# Patient Record
Sex: MALE | Race: WHITE | NOT HISPANIC OR LATINO | Employment: OTHER | URBAN - NONMETROPOLITAN AREA
[De-identification: names, ages, dates, MRNs, and addresses within clinical notes are randomized per-mention and may not be internally consistent; named-entity substitution may affect disease eponyms.]

---

## 2017-05-30 ENCOUNTER — TELEPHONE (OUTPATIENT)
Dept: URGENT CARE | Facility: PHYSICIAN GROUP | Age: 67
End: 2017-05-30

## 2017-05-30 DIAGNOSIS — Z13.228 SCREENING FOR METABOLIC DISORDER: ICD-10-CM

## 2017-05-30 DIAGNOSIS — Z13.0 SCREENING FOR DEFICIENCY ANEMIA: ICD-10-CM

## 2017-05-30 NOTE — TELEPHONE ENCOUNTER
1. Caller Name: Cem                      Call Back Number: [phone] (home)       2. Message: Cem would like to get lab work done tomorrow so he can have it done before his appointment with you on June 12.    3. Patient approves office to leave a detailed voicemail/MyChart message: N\A

## 2017-05-31 ENCOUNTER — HOSPITAL ENCOUNTER (OUTPATIENT)
Dept: LAB | Facility: MEDICAL CENTER | Age: 67
End: 2017-05-31
Attending: FAMILY MEDICINE
Payer: MEDICARE

## 2017-05-31 DIAGNOSIS — Z13.0 SCREENING FOR DEFICIENCY ANEMIA: ICD-10-CM

## 2017-05-31 DIAGNOSIS — Z13.228 SCREENING FOR METABOLIC DISORDER: ICD-10-CM

## 2017-05-31 LAB
ALBUMIN SERPL BCP-MCNC: 4.2 G/DL (ref 3.2–4.9)
ALBUMIN/GLOB SERPL: 1.6 G/DL
ALP SERPL-CCNC: 53 U/L (ref 30–99)
ALT SERPL-CCNC: 13 U/L (ref 2–50)
ANION GAP SERPL CALC-SCNC: 6 MMOL/L (ref 0–11.9)
AST SERPL-CCNC: 17 U/L (ref 12–45)
BASOPHILS # BLD AUTO: 0.3 % (ref 0–1.8)
BASOPHILS # BLD: 0.02 K/UL (ref 0–0.12)
BILIRUB SERPL-MCNC: 0.8 MG/DL (ref 0.1–1.5)
BUN SERPL-MCNC: 21 MG/DL (ref 8–22)
CALCIUM SERPL-MCNC: 9.4 MG/DL (ref 8.5–10.5)
CHLORIDE SERPL-SCNC: 104 MMOL/L (ref 96–112)
CO2 SERPL-SCNC: 29 MMOL/L (ref 20–33)
CREAT SERPL-MCNC: 0.96 MG/DL (ref 0.5–1.4)
EOSINOPHIL # BLD AUTO: 0.12 K/UL (ref 0–0.51)
EOSINOPHIL NFR BLD: 2 % (ref 0–6.9)
ERYTHROCYTE [DISTWIDTH] IN BLOOD BY AUTOMATED COUNT: 44.5 FL (ref 35.9–50)
GFR SERPL CREATININE-BSD FRML MDRD: >60 ML/MIN/1.73 M 2
GLOBULIN SER CALC-MCNC: 2.7 G/DL (ref 1.9–3.5)
GLUCOSE SERPL-MCNC: 84 MG/DL (ref 65–99)
HCT VFR BLD AUTO: 49.2 % (ref 42–52)
HGB BLD-MCNC: 16.2 G/DL (ref 14–18)
IMM GRANULOCYTES # BLD AUTO: 0.01 K/UL (ref 0–0.11)
IMM GRANULOCYTES NFR BLD AUTO: 0.2 % (ref 0–0.9)
LYMPHOCYTES # BLD AUTO: 1.73 K/UL (ref 1–4.8)
LYMPHOCYTES NFR BLD: 29.3 % (ref 22–41)
MCH RBC QN AUTO: 31 PG (ref 27–33)
MCHC RBC AUTO-ENTMCNC: 32.9 G/DL (ref 33.7–35.3)
MCV RBC AUTO: 94.3 FL (ref 81.4–97.8)
MONOCYTES # BLD AUTO: 0.43 K/UL (ref 0–0.85)
MONOCYTES NFR BLD AUTO: 7.3 % (ref 0–13.4)
NEUTROPHILS # BLD AUTO: 3.6 K/UL (ref 1.82–7.42)
NEUTROPHILS NFR BLD: 60.9 % (ref 44–72)
NRBC # BLD AUTO: 0 K/UL
NRBC BLD AUTO-RTO: 0 /100 WBC
PLATELET # BLD AUTO: 204 K/UL (ref 164–446)
PMV BLD AUTO: 10.9 FL (ref 9–12.9)
POTASSIUM SERPL-SCNC: 4.1 MMOL/L (ref 3.6–5.5)
PROT SERPL-MCNC: 6.9 G/DL (ref 6–8.2)
RBC # BLD AUTO: 5.22 M/UL (ref 4.7–6.1)
SODIUM SERPL-SCNC: 139 MMOL/L (ref 135–145)
WBC # BLD AUTO: 5.9 K/UL (ref 4.8–10.8)

## 2017-05-31 PROCEDURE — 85025 COMPLETE CBC W/AUTO DIFF WBC: CPT | Mod: GY

## 2017-05-31 PROCEDURE — 36415 COLL VENOUS BLD VENIPUNCTURE: CPT | Mod: GY

## 2017-05-31 PROCEDURE — 80053 COMPREHEN METABOLIC PANEL: CPT | Mod: GY

## 2017-06-02 ENCOUNTER — TELEPHONE (OUTPATIENT)
Dept: MEDICAL GROUP | Facility: PHYSICIAN GROUP | Age: 67
End: 2017-06-02

## 2017-06-02 NOTE — TELEPHONE ENCOUNTER
----- Message from Mert Muniz M.D. sent at 6/1/2017  7:45 PM PDT -----  Your labs are all normal  Mert Muniz M.D.

## 2017-06-12 ENCOUNTER — OFFICE VISIT (OUTPATIENT)
Dept: MEDICAL GROUP | Facility: PHYSICIAN GROUP | Age: 67
End: 2017-06-12
Payer: MEDICARE

## 2017-06-12 VITALS
BODY MASS INDEX: 23.49 KG/M2 | OXYGEN SATURATION: 95 % | DIASTOLIC BLOOD PRESSURE: 74 MMHG | WEIGHT: 155 LBS | RESPIRATION RATE: 12 BRPM | TEMPERATURE: 97.5 F | HEIGHT: 68 IN | SYSTOLIC BLOOD PRESSURE: 136 MMHG | HEART RATE: 68 BPM

## 2017-06-12 DIAGNOSIS — Z00.00 ANNUAL PHYSICAL EXAM: ICD-10-CM

## 2017-06-12 DIAGNOSIS — R05.9 COUGH: ICD-10-CM

## 2017-06-12 PROCEDURE — 99214 OFFICE O/P EST MOD 30 MIN: CPT | Performed by: FAMILY MEDICINE

## 2017-06-12 ASSESSMENT — PATIENT HEALTH QUESTIONNAIRE - PHQ9: CLINICAL INTERPRETATION OF PHQ2 SCORE: 0

## 2017-06-12 NOTE — PROGRESS NOTES
"Subjective:   Cem Esquivel is a 67 y.o. male here today for his annual visit.     Cough  Feels taste buds are swollen  Wakes up at night with a choking sensation, gargling with salt and taking a menthol drop helps him go back to sleep  Dry nostrils. When he cleans the house and exposed to dust, starts sneezing and coughing.   Symptoms started after moving from [facility] to [place].   Advised to continue with salt water gargling, try some OTC claritin Flonase.   He had surgery in the past for removal of uvula which was too long. This was done by ENT.       Annual physical exam  Patient is here for annual exam. He was seen by ophthalmology for floaters in vision.They have lessened. He will follow up with ophthalmology as needed.   Up to date on colonoscopy, got tenanus immunization done in last 5 yrs.   3 mm pulm nodule stable on CT chest x 3 for 2 years. No further work up needed.  States he has Allergies: advised on OTC claritin and flonase. I offered him singulair and he will try OTC first.            Current medicines (including changes today)  No current outpatient prescriptions on file.     No current facility-administered medications for this visit.     He  has a past medical history of Diverticulitis and Pulmonary nodule.    ROS   No chest pain, no shortness of breath, no abdominal pain       Objective:     Blood pressure 136/74, pulse 68, temperature 36.4 °C (97.5 °F), resp. rate 12, height 1.727 m (5' 7.99\"), weight 70.308 kg (155 lb), SpO2 95 %. Body mass index is 23.57 kg/(m^2).  Physical Exam:  Constitutional: Alert, no distress.  Skin: Warm, dry, good turgor, no rashes in visible areas.  Eye: Equal, round and reactive, conjunctiva clear, lids normal.  ENMT: Lips without lesions, good dentition, oropharynx clear.  Neck: Trachea midline, no masses, no thyromegaly. No cervical or supraclavicular lymphadenopathy  Respiratory: Unlabored respiratory effort, lungs clear to auscultation, no wheezes, no " kishore.  Cardiovascular: Normal S1, S2, no murmur, no edema.  Abdomen: Soft, non-tender, no masses, no hepatosplenomegaly.  Psych: Alert and oriented x3, normal affect and mood.        Assessment and Plan:   The following treatment plan was discussed    1. Cough  OTC claritin and flonase recommended for allergies. Continue with salt water gargling as needed.   CT scans reviewed, stable 3mm lung nodule requiring no further work up. Will monitor for any worsening symptoms.     2. Annual physical exam  Labs and chest CT reviewed. All concerns addressed. He is up todate on colonoscopy and tetanus.   Patient will follow up in a year.         Followup: Return in about 1 year (around 6/12/2018), or if symptoms worsen or fail to improve, for annual.

## 2017-06-12 NOTE — MR AVS SNAPSHOT
"        Cem Neal Sierra   2017 8:00 AM   Office Visit   MRN: [id number]    Department:  [facility]   Dept Phone:  [phone]    Description:  Male : 1950   Provider:  Mert Muniz M.D.           Reason for Visit     Annual Exam           Allergies as of 2017     No Known Allergies      You were diagnosed with     Cough   [786.2.ICD-9-CM]       Annual physical exam   [749997]         Vital Signs     Blood Pressure Pulse Temperature Respirations Height Weight    136/74 mmHg 68 36.4 °C (97.5 °F) 12 1.727 m (5' 7.99\") 70.308 kg (155 lb)    Body Mass Index Oxygen Saturation Smoking Status             23.57 kg/m2 95% Former Smoker         Basic Information     Date Of Birth Sex Race Ethnicity Preferred Language    1950 Male White Non- English      Problem List              ICD-10-CM Priority Class Noted - Resolved    Smoking history Z87.891   2014 - Present    Family history of diabetes mellitus type II Z83.3   2014 - Present    Incidental pulmonary nodule, greater than or equal to 8mm R91.1   8/15/2014 - Present    Cough R05   2016 - Present    Annual physical exam Z00.00   2017 - Present      Health Maintenance        Date Due Completion Dates    IMM DTaP/Tdap/Td Vaccine (1 - Tdap) 1968 ---    IMM ZOSTER VACCINE 2009 ---    COLONOSCOPY 2026            Current Immunizations     13-VALENT PCV PREVNAR 2015 10:33 AM    Pneumococcal polysaccharide vaccine (PPSV-23) 2016      Below and/or attached are the medications your provider expects you to take. Review all of your home medications and newly ordered medications with your provider and/or pharmacist. Follow medication instructions as directed by your provider and/or pharmacist. Please keep your medication list with you and share with your provider. Update the information when medications are discontinued, doses are changed, or new medications (including over-the-counter " products) are added; and carry medication information at all times in the event of emergency situations     Allergies:  No Known Allergies          Medications  Valid as of: June 12, 2017 -  8:43 AM    Generic Name Brand Name Tablet Size Instructions for use    .                 Medicines prescribed today were sent to:     [facility] [address] [place] [address], [place] - [address] [facility]    [address] [facility] [address] [facility] [place] [address]    Phone: [phone] Fax: [phone]    Open 24 Hours?: No      Medication refill instructions:       If your prescription bottle indicates you have medication refills left, it is not necessary to call your provider’s office. Please contact your pharmacy and they will refill your medication.    If your prescription bottle indicates you do not have any refills left, you may request refills at any time through one of the following ways: The online [employer] system (except Urgent Care), by calling your provider’s office, or by asking your pharmacy to contact your provider’s office with a refill request. Medication refills are processed only during regular business hours and may not be available until the next business day. Your provider may request additional information or to have a follow-up visit with you prior to refilling your medication.   *Please Note: Medication refills are assigned a new Rx number when refilled electronically. Your pharmacy may indicate that no refills were authorized even though a new prescription for the same medication is available at the pharmacy. Please request the medicine by name with the pharmacy before contacting your provider for a refill.           [employer] Access Code: Activation code not generated  Current [employer] Status: Active

## 2017-06-12 NOTE — ASSESSMENT & PLAN NOTE
Patient is here for annual exam. He was seen by ophthalmology for floaters in vision.They have lessened. He will follow up with ophthalmology as needed.   Up to date on colonoscopy, got tenanus immunization done in last 5 yrs.   3 mm pulm nodule stable on CT chest x 3 for 2 years. No further work up needed.  States he has Allergies: advised on OTC claritin and flonase. I offered him singulair and he will try OTC first.

## 2018-03-30 ENCOUNTER — TELEPHONE (OUTPATIENT)
Dept: URGENT CARE | Facility: PHYSICIAN GROUP | Age: 68
End: 2018-03-30

## 2018-03-31 NOTE — TELEPHONE ENCOUNTER
1. Caller Name: Cem                       Call Back Number: [phone]    2. Message: Cem would like to know if he needs labs done prior to his next appointment.    3. Patient approves office to leave a detailed voicemail/MyChart message: no

## 2018-04-08 DIAGNOSIS — E78.5 DYSLIPIDEMIA: ICD-10-CM

## 2018-04-08 DIAGNOSIS — Z13.228 SCREENING FOR METABOLIC DISORDER: ICD-10-CM

## 2018-04-16 ENCOUNTER — HOSPITAL ENCOUNTER (OUTPATIENT)
Dept: LAB | Facility: MEDICAL CENTER | Age: 68
End: 2018-04-16
Attending: FAMILY MEDICINE
Payer: MEDICARE

## 2018-04-16 DIAGNOSIS — Z13.228 SCREENING FOR METABOLIC DISORDER: ICD-10-CM

## 2018-04-16 DIAGNOSIS — E78.5 DYSLIPIDEMIA: ICD-10-CM

## 2018-04-16 LAB
ALBUMIN SERPL BCP-MCNC: 4.1 G/DL (ref 3.2–4.9)
ALBUMIN/GLOB SERPL: 1.4 G/DL
ALP SERPL-CCNC: 56 U/L (ref 30–99)
ALT SERPL-CCNC: 17 U/L (ref 2–50)
ANION GAP SERPL CALC-SCNC: 7 MMOL/L (ref 0–11.9)
AST SERPL-CCNC: 24 U/L (ref 12–45)
BILIRUB SERPL-MCNC: 0.8 MG/DL (ref 0.1–1.5)
BUN SERPL-MCNC: 16 MG/DL (ref 8–22)
CALCIUM SERPL-MCNC: 9.4 MG/DL (ref 8.5–10.5)
CHLORIDE SERPL-SCNC: 99 MMOL/L (ref 96–112)
CHOLEST SERPL-MCNC: 205 MG/DL (ref 100–199)
CO2 SERPL-SCNC: 28 MMOL/L (ref 20–33)
CREAT SERPL-MCNC: 0.98 MG/DL (ref 0.5–1.4)
GLOBULIN SER CALC-MCNC: 3 G/DL (ref 1.9–3.5)
GLUCOSE SERPL-MCNC: 88 MG/DL (ref 65–99)
HDLC SERPL-MCNC: 55 MG/DL
LDLC SERPL CALC-MCNC: 134 MG/DL
POTASSIUM SERPL-SCNC: 4.4 MMOL/L (ref 3.6–5.5)
PROT SERPL-MCNC: 7.1 G/DL (ref 6–8.2)
SODIUM SERPL-SCNC: 134 MMOL/L (ref 135–145)
TRIGL SERPL-MCNC: 82 MG/DL (ref 0–149)

## 2018-04-16 PROCEDURE — 80053 COMPREHEN METABOLIC PANEL: CPT

## 2018-04-16 PROCEDURE — 36415 COLL VENOUS BLD VENIPUNCTURE: CPT

## 2018-04-16 PROCEDURE — 80061 LIPID PANEL: CPT

## 2018-06-14 ENCOUNTER — OFFICE VISIT (OUTPATIENT)
Dept: MEDICAL GROUP | Facility: PHYSICIAN GROUP | Age: 68
End: 2018-06-14
Payer: MEDICARE

## 2018-06-14 VITALS
OXYGEN SATURATION: 98 % | WEIGHT: 151.4 LBS | HEIGHT: 68 IN | TEMPERATURE: 98 F | SYSTOLIC BLOOD PRESSURE: 138 MMHG | DIASTOLIC BLOOD PRESSURE: 82 MMHG | RESPIRATION RATE: 16 BRPM | BODY MASS INDEX: 22.94 KG/M2 | HEART RATE: 69 BPM

## 2018-06-14 DIAGNOSIS — R39.9 LOWER URINARY TRACT SYMPTOMS (LUTS): ICD-10-CM

## 2018-06-14 DIAGNOSIS — Z12.5 SCREENING FOR MALIGNANT NEOPLASM OF PROSTATE: ICD-10-CM

## 2018-06-14 DIAGNOSIS — E78.5 DYSLIPIDEMIA: ICD-10-CM

## 2018-06-14 DIAGNOSIS — R05.9 COUGH: ICD-10-CM

## 2018-06-14 DIAGNOSIS — R39.11 URINARY HESITANCY: ICD-10-CM

## 2018-06-14 PROCEDURE — 99214 OFFICE O/P EST MOD 30 MIN: CPT | Performed by: FAMILY MEDICINE

## 2018-06-14 RX ORDER — LORATADINE 10 MG/1
10 TABLET ORAL DAILY
COMMUNITY
End: 2019-07-18

## 2018-06-14 ASSESSMENT — PATIENT HEALTH QUESTIONNAIRE - PHQ9: CLINICAL INTERPRETATION OF PHQ2 SCORE: 0

## 2018-06-14 NOTE — ASSESSMENT & PLAN NOTE
Has made big changes to his diet, cut out cookies, chips, milkshakes.   Results for NII JON (MRN [id number]) as of 6/14/2018 08:19   Ref. Range 4/16/2018 08:52   Cholesterol,Tot Latest Ref Range: 100 - 199 mg/dL 205 (H)   Triglycerides Latest Ref Range: 0 - 149 mg/dL 82   HDL Latest Ref Range: >=40 mg/dL 55   LDL Latest Ref Range: <100 mg/dL 134 (H)

## 2018-06-14 NOTE — ASSESSMENT & PLAN NOTE
Continues to have cough. He has been using loratidine which helps him 30% improvement in symptoms.

## 2018-06-14 NOTE — PROGRESS NOTES
"Subjective:   Cem Jon is a 68 y.o. male here today for evaluation and management of:     Cough  Continues to have cough. He has been using loratidine which helps him 30% improvement in symptoms.       Dyslipidemia  Has made big changes to his diet, cut out cookies, chips, milkshakes.   Results for CEM JON (MRN [id number]) as of 6/14/2018 08:19   Ref. Range 4/16/2018 08:52   Cholesterol,Tot Latest Ref Range: 100 - 199 mg/dL 205 (H)   Triglycerides Latest Ref Range: 0 - 149 mg/dL 82   HDL Latest Ref Range: >=40 mg/dL 55   LDL Latest Ref Range: <100 mg/dL 134 (H)          Current medicines (including changes today)  Current Outpatient Prescriptions   Medication Sig Dispense Refill   • loratadine (CLARITIN) 10 MG Tab Take 10 mg by mouth every day.       No current facility-administered medications for this visit.      He  has a past medical history of Diverticulitis and Pulmonary nodule.    ROS  No chest pain, no shortness of breath, no abdominal pain       Objective:     Blood pressure 138/82, pulse 69, temperature 36.7 °C (98 °F), resp. rate 16, height 1.715 m (5' 7.5\"), weight 68.7 kg (151 lb 6.4 oz), SpO2 98 %. Body mass index is 23.36 kg/m².   Physical Exam:  Constitutional: Alert, no distress.  Skin: Warm, dry, good turgor, no rashes in visible areas.  Eye: Equal, round and reactive, conjunctiva clear, lids normal.  ENMT: Lips without lesions, good dentition, oropharynx clear.  Neck: Trachea midline, no masses, no thyromegaly. No cervical or supraclavicular lymphadenopathy  Respiratory: Unlabored respiratory effort, lungs clear to auscultation, no wheezes, no ronchi.  Cardiovascular: Normal S1, S2, no murmur, no edema.  Abdomen: Soft, non-tender, no masses, no hepatosplenomegaly.  Psych: Alert and oriented x3, normal affect and mood.        Assessment and Plan:   The following treatment plan was discussed    1. Cough  Due to allergies improved 30% with the loratadine  - COMP METABOLIC PANEL; " Future    2. Dyslipidemia  Has made good weight changes  - LIPID PROFILE; Future      Followup: No Follow-up on file.

## 2019-02-12 ENCOUNTER — OFFICE VISIT (OUTPATIENT)
Dept: URGENT CARE | Facility: PHYSICIAN GROUP | Age: 69
End: 2019-02-12
Payer: MEDICARE

## 2019-02-12 VITALS
BODY MASS INDEX: 22.28 KG/M2 | RESPIRATION RATE: 16 BRPM | TEMPERATURE: 97.5 F | HEIGHT: 68 IN | WEIGHT: 147 LBS | OXYGEN SATURATION: 95 % | HEART RATE: 56 BPM | SYSTOLIC BLOOD PRESSURE: 124 MMHG | DIASTOLIC BLOOD PRESSURE: 84 MMHG

## 2019-02-12 DIAGNOSIS — J20.9 ACUTE BRONCHITIS, UNSPECIFIED ORGANISM: ICD-10-CM

## 2019-02-12 DIAGNOSIS — J22 ACUTE LOWER RESPIRATORY INFECTION: ICD-10-CM

## 2019-02-12 PROCEDURE — 99214 OFFICE O/P EST MOD 30 MIN: CPT | Performed by: FAMILY MEDICINE

## 2019-02-12 RX ORDER — AZITHROMYCIN 250 MG/1
TABLET, FILM COATED ORAL
Qty: 6 TAB | Refills: 0 | Status: SHIPPED | OUTPATIENT
Start: 2019-02-12 | End: 2019-07-10

## 2019-02-12 NOTE — PROGRESS NOTES
Chief Complaint:    Chief Complaint   Patient presents with   • URI     x 1 week, chest congestin       History of Present Illness:    This is a new problem. Symptoms x 7 days; has chest congestion and cough productive of purulent mucus. Overall at least moderate severity and not getting better, and possibly getting worse. Some clear rhinorrhea. No fever or sore throat. Z-vinod works/tolerates for similar previous symptoms.      Review of Systems:    Constitutional: Negative for fever, chills, and diaphoresis.   Eyes: Negative for change in vision, photophobia, pain, redness, and discharge.  ENT: See HPI.    Respiratory: See HPI.  Cardiovascular: Negative for chest pain, palpitations, orthopnea, claudication, leg swelling, and PND.   Gastrointestinal: Negative for abdominal pain, nausea, vomiting, diarrhea, constipation, blood in stool, and melena.   Genitourinary: Negative for dysuria, urinary urgency, urinary frequency, hematuria, and flank pain.   Musculoskeletal: Negative for myalgias, joint pain, neck pain, and back pain.   Skin: Negative for rash and itching.   Neurological: Negative for dizziness, tingling, tremors, sensory change, speech change, focal weakness, seizures, loss of consciousness, and headaches.   Endo: Negative for polydipsia.   Heme: Does not bruise/bleed easily.   Psychiatric/Behavioral: Negative for depression, suicidal ideas, hallucinations, memory loss and substance abuse. The patient is not nervous/anxious and does not have insomnia.        Past Medical History:    Past Medical History:   Diagnosis Date   • Diverticulitis    • Pulmonary nodule      Past Surgical History:    Past Surgical History:   Procedure Laterality Date   • COLON RESECTION      diverticulitis, 1980s     Social History:    Social History     Social History   • Marital status:      Spouse name: N/A   • Number of children: N/A   • Years of education: N/A     Occupational History   • Not on file.     Social History  "Main Topics   • Smoking status: Former Smoker     Packs/day: 1.00     Years: 35.00     Quit date: 8/7/2007   • Smokeless tobacco: Never Used   • Alcohol use No   • Drug use: No   • Sexual activity: Yes     Partners: Male     Other Topics Concern   • Not on file     Social History Narrative   • No narrative on file     Family History:    Family History   Problem Relation Age of Onset   • Heart Disease Mother    • Diabetes Mother    • Heart Disease Father      Medications:    Current Outpatient Prescriptions on File Prior to Visit   Medication Sig Dispense Refill   • loratadine (CLARITIN) 10 MG Tab Take 10 mg by mouth every day.       No current facility-administered medications on file prior to visit.      Allergies:    No Known Allergies      Vitals:    Vitals:    02/12/19 1455   BP: 124/84   BP Location: Left arm   Patient Position: Sitting   Pulse: (!) 56   Resp: 16   Temp: 36.4 °C (97.5 °F)   TempSrc: Temporal   SpO2: 95%   Weight: 66.7 kg (147 lb)   Height: 1.715 m (5' 7.5\")       Physical Exam:    Constitutional: Vital signs reviewed. Appears well-developed and well-nourished. Occl cough. No acute distress.   Eyes: Sclera white, conjunctivae clear.  ENT: External ears normal. Hearing grossly decreased. Nasal mucosa pink. Lips/teeth are normal. Oral mucosa pink and moist. Posterior pharynx: WNL.  Neck: Neck supple.   Cardiovascular: Regular rate and rhythm. No murmur.  Pulmonary/Chest: Respirations non-labored. Clear to auscultation bilaterally.  Lymph: Cervical nodes without tenderness or enlargement.  Musculoskeletal: Normal gait. Normal range of motion. No muscular atrophy or weakness.  Neurological: Alert and oriented to person, place, and time. Muscle tone normal. Coordination normal.   Skin: No rashes or lesions. Warm, dry, normal turgor.  Psychiatric: Normal mood and affect. Behavior is normal. Judgment and thought content normal.       Assessment / Plan:    1. Acute lower respiratory infection  - " azithromycin (ZITHROMAX) 250 MG Tab; 2 TABS ON DAY 1, 1 TAB ON DAYS 2-5.  Dispense: 6 Tab; Refill: 0    2. Acute bronchitis, unspecified organism      Discussed with him DDX, management options, and risks, benefits, and alternatives to treatment plan agreed upon.    Agreeable to medication prescribed.    Discussed expected course of duration, time for improvement, and to seek follow-up in Emergency Room, urgent care, or with PCP if getting worse at any time or not improving within expected time frame.

## 2019-04-15 ENCOUNTER — HOSPITAL ENCOUNTER (OUTPATIENT)
Dept: LAB | Facility: MEDICAL CENTER | Age: 69
End: 2019-04-15
Attending: FAMILY MEDICINE
Payer: MEDICARE

## 2019-04-15 DIAGNOSIS — Z12.5 SCREENING FOR MALIGNANT NEOPLASM OF PROSTATE: ICD-10-CM

## 2019-04-15 DIAGNOSIS — E78.5 DYSLIPIDEMIA: ICD-10-CM

## 2019-04-15 DIAGNOSIS — R05.9 COUGH: ICD-10-CM

## 2019-04-15 LAB
ALBUMIN SERPL BCP-MCNC: 4.5 G/DL (ref 3.2–4.9)
ALBUMIN/GLOB SERPL: 2 G/DL
ALP SERPL-CCNC: 49 U/L (ref 30–99)
ALT SERPL-CCNC: 20 U/L (ref 2–50)
ANION GAP SERPL CALC-SCNC: 7 MMOL/L (ref 0–11.9)
AST SERPL-CCNC: 25 U/L (ref 12–45)
BILIRUB SERPL-MCNC: 0.5 MG/DL (ref 0.1–1.5)
BUN SERPL-MCNC: 19 MG/DL (ref 8–22)
CALCIUM SERPL-MCNC: 9.6 MG/DL (ref 8.5–10.5)
CHLORIDE SERPL-SCNC: 104 MMOL/L (ref 96–112)
CHOLEST SERPL-MCNC: 196 MG/DL (ref 100–199)
CO2 SERPL-SCNC: 29 MMOL/L (ref 20–33)
CREAT SERPL-MCNC: 0.95 MG/DL (ref 0.5–1.4)
FASTING STATUS PATIENT QL REPORTED: NORMAL
GLOBULIN SER CALC-MCNC: 2.2 G/DL (ref 1.9–3.5)
GLUCOSE SERPL-MCNC: 97 MG/DL (ref 65–99)
HDLC SERPL-MCNC: 54 MG/DL
LDLC SERPL CALC-MCNC: 129 MG/DL
POTASSIUM SERPL-SCNC: 4.4 MMOL/L (ref 3.6–5.5)
PROT SERPL-MCNC: 6.7 G/DL (ref 6–8.2)
PSA SERPL-MCNC: 1.07 NG/ML (ref 0–4)
SODIUM SERPL-SCNC: 140 MMOL/L (ref 135–145)
TRIGL SERPL-MCNC: 67 MG/DL (ref 0–149)

## 2019-04-15 PROCEDURE — 84153 ASSAY OF PSA TOTAL: CPT | Mod: GA

## 2019-04-15 PROCEDURE — 80053 COMPREHEN METABOLIC PANEL: CPT

## 2019-04-15 PROCEDURE — 36415 COLL VENOUS BLD VENIPUNCTURE: CPT

## 2019-04-15 PROCEDURE — 80061 LIPID PANEL: CPT

## 2019-07-10 ENCOUNTER — OFFICE VISIT (OUTPATIENT)
Dept: MEDICAL GROUP | Facility: PHYSICIAN GROUP | Age: 69
End: 2019-07-10
Payer: MEDICARE

## 2019-07-10 VITALS
HEART RATE: 71 BPM | RESPIRATION RATE: 16 BRPM | BODY MASS INDEX: 22.13 KG/M2 | TEMPERATURE: 98.8 F | SYSTOLIC BLOOD PRESSURE: 130 MMHG | OXYGEN SATURATION: 95 % | HEIGHT: 68 IN | DIASTOLIC BLOOD PRESSURE: 80 MMHG | WEIGHT: 146 LBS

## 2019-07-10 DIAGNOSIS — Q38.5: ICD-10-CM

## 2019-07-10 DIAGNOSIS — Z00.00 ANNUAL PHYSICAL EXAM: ICD-10-CM

## 2019-07-10 DIAGNOSIS — K12.2: ICD-10-CM

## 2019-07-10 DIAGNOSIS — Z87.891 SMOKING HISTORY: ICD-10-CM

## 2019-07-10 DIAGNOSIS — R05.9 COUGH: ICD-10-CM

## 2019-07-10 DIAGNOSIS — R91.1 INCIDENTAL PULMONARY NODULE, GREATER THAN OR EQUAL TO 8MM: ICD-10-CM

## 2019-07-10 DIAGNOSIS — E78.5 DYSLIPIDEMIA: ICD-10-CM

## 2019-07-10 PROCEDURE — G0439 PPPS, SUBSEQ VISIT: HCPCS | Performed by: FAMILY MEDICINE

## 2019-07-10 ASSESSMENT — PATIENT HEALTH QUESTIONNAIRE - PHQ9: CLINICAL INTERPRETATION OF PHQ2 SCORE: 0

## 2019-07-10 ASSESSMENT — ACTIVITIES OF DAILY LIVING (ADL): BATHING_REQUIRES_ASSISTANCE: 0

## 2019-07-10 ASSESSMENT — ENCOUNTER SYMPTOMS: GENERAL WELL-BEING: EXCELLENT

## 2019-07-10 NOTE — ASSESSMENT & PLAN NOTE
Chronic condition improvingResults for NII JON (MRN [id number]) as of 7/10/2019 09:32   Ref. Range 4/15/2019 07:08   Cholesterol,Tot Latest Ref Range: 100 - 199 mg/dL 196   Triglycerides Latest Ref Range: 0 - 149 mg/dL 67   HDL Latest Ref Range: >=40 mg/dL 54   LDL Latest Ref Range: <100 mg/dL 129 (H)   Patient does not take a statin this is diet controlled.

## 2019-07-10 NOTE — ASSESSMENT & PLAN NOTE
Small stable pulmonary nodules on CT scan no further evaluation needed.  Patient is asymptomatic emphysema seen on CT scan patient has no complaints of shortness of breath.

## 2019-07-10 NOTE — ASSESSMENT & PLAN NOTE
Patient presents here for annual exam.  He notes he is feeling well with no shortness of breath.  Labs reviewed mild elevation in LDL but improved from last year CT scan last done reviewed shows stable pulmonary nodules.  Patient is up-to-date on his colonoscopy.  He is not interested in the shingles vaccine due to exorbitant cost

## 2019-07-10 NOTE — ASSESSMENT & PLAN NOTE
Patient continues to have a persistent cough that wakes him up at night.  No improvement with loratadine.  History of surgical removal of uvula in the past.  Also has numbness on the back of his tongue and in his throat.  Patient has seen Dr. Tang in the past was very pleased with this consult I will place a referral back to ENT.

## 2019-07-10 NOTE — PROGRESS NOTES
Chief Complaint   Patient presents with   • Annual Exam   • Weight Loss     x6 months   • Difficulty Swallowing     x 6mo-1year         HPI:  Cem Eqsuivel is a 69 y.o. here for Medicare Annual Wellness Visit     Patient Active Problem List    Diagnosis Date Noted   • Dyslipidemia 06/14/2018   • Annual physical exam 06/12/2017   • Cough 02/29/2016   • Incidental pulmonary nodule, greater than or equal to 8mm 08/15/2014   • Smoking history 08/07/2014   • Family history of diabetes mellitus type II 08/07/2014       Current Outpatient Prescriptions   Medication Sig Dispense Refill   • loratadine (CLARITIN) 10 MG Tab Take 10 mg by mouth every day.       No current facility-administered medications for this visit.             Current supplements as per medication list.       Allergies: Patient has no known allergies.    Current social contact/activities:      He  reports that he quit smoking about 11 years ago. He has a 35.00 pack-year smoking history. He has never used smokeless tobacco. He reports that he does not drink alcohol or use drugs.  Counseling given: Not Answered      DPA/Advanced Directive:  Patient does not have an Advanced Directive.  A packet and workshop information was given on Advanced Directives.    ROS:    Gait: Uses no assistive device  Ostomy: No  Other tubes: No  Amputations: No  Chronic oxygen use: No  Last eye exam: yesterday  Wears hearing aids: No   : Denies any urinary leakage during the last 6 months    Screening:    Depression Screening    Little interest or pleasure in doing things?  0 - not at all  Feeling down, depressed , or hopeless? 0 - not at all  Patient Health Questionnaire Score: 0     If depressive symptoms identified deferred to follow up visit unless specifically addressed in assessment and plan.    Interpretation of PHQ-9 Total Score   Score Severity   1-4 No Depression   5-9 Mild Depression   10-14 Moderate Depression   15-19 Moderately Severe Depression   20-27 Severe  Depression    Screening for Cognitive Impairment    Three Minute Recall (village, kitchen, baby) 2/3    Tonny clock face with all 12 numbers and set the hands to show 10 past 10.  Yes    Cognitive concerns identified deferred for follow up unless specifically addressed in assessment and plan.    Fall Risk Assessment    Has the patient had two or more falls in the last year or any fall with injury in the last year?  No    Safety Assessment    Throw rugs on floor.  No  Handrails on all stairs.  Yes  Good lighting in all hallways.  Yes  Difficulty hearing.  No  Patient counseled about all safety risks that were identified.    Functional Assessment ADLs    Are there any barriers preventing you from cooking for yourself or meeting nutritional needs?  No.    Are there any barriers preventing you from driving safely or obtaining transportation?  No.    Are there any barriers preventing you from using a telephone or calling for help?  No.    Are there any barriers preventing you from shopping?  No.    Are there any barriers preventing you from taking care of your own finances?  No.    Are there any barriers preventing you from managing your medications?  No.    Are there any barriers preventing you from showering, bathing or dressing yourself?  No.    Are you currently engaging in any exercise or physical activity?  Yes.     What is your perception of your health?  Excellent.      Health Maintenance Summary                HEPATITIS C SCREENING Overdue 1950     IMM ZOSTER VACCINES Overdue 5/22/1999     Annual Wellness Visit Overdue 6/10/2017      Done 6/9/2016 Visit Dx: Medicare annual wellness visit, subsequent    IMM INFLUENZA Next Due 9/1/2019     IMM DTaP/Tdap/Td Vaccine Next Due 5/18/2025      Done 5/18/2015 Imm Admin: Tdap Vaccine    COLONOSCOPY Next Due 7/11/2026      Done 7/11/2016 AMB REFERRAL TO GI FOR COLONOSCOPY          Patient Care Team:  Mert Muniz M.D. as PCP - General (Family Medicine)  Bruna SAMAYOA  "SYED Tang (Otolaryngology)        Social History   Substance Use Topics   • Smoking status: Former Smoker     Packs/day: 1.00     Years: 35.00     Quit date: 8/7/2007   • Smokeless tobacco: Never Used   • Alcohol use No     Family History   Problem Relation Age of Onset   • Heart Disease Mother    • Diabetes Mother    • Heart Disease Father      He  has a past medical history of Diverticulitis and Pulmonary nodule.   Past Surgical History:   Procedure Laterality Date   • COLON RESECTION      diverticulitis, 1980s       Exam:   /80 (BP Location: Left arm, Patient Position: Sitting, BP Cuff Size: Adult)   Pulse 71   Temp 37.1 °C (98.8 °F) (Temporal)   Resp 16   Ht 1.727 m (5' 8\")   Wt 66.2 kg (146 lb)   SpO2 95%  Body mass index is 22.2 kg/m².    Hearing excellent.    Dentition good  Alert, oriented in no acute distress.  Eye contact is good, speech goal directed, affect calm    Assessment and Plan. The following treatment and monitoring plan is recommended:    Incidental pulmonary nodule, greater than or equal to 8mm  Stable on repeat CT scan.     Annual physical exam  Patient presents here for annual exam.  He notes he is feeling well with no shortness of breath.  Labs reviewed mild elevation in LDL but improved from last year CT scan last done reviewed shows stable pulmonary nodules.  Patient is up-to-date on his colonoscopy.  He is not interested in the shingles vaccine due to exorbitant cost    Dyslipidemia  Chronic condition improvingResults for NII JON (MRN [id number]) as of 7/10/2019 09:32   Ref. Range 4/15/2019 07:08   Cholesterol,Tot Latest Ref Range: 100 - 199 mg/dL 196   Triglycerides Latest Ref Range: 0 - 149 mg/dL 67   HDL Latest Ref Range: >=40 mg/dL 54   LDL Latest Ref Range: <100 mg/dL 129 (H)   Patient does not take a statin this is diet controlled.    Smoking history  Small stable pulmonary nodules on CT scan no further evaluation needed.  Patient is asymptomatic emphysema " seen on CT scan patient has no complaints of shortness of breath.    Cough  Patient continues to have a persistent cough that wakes him up at night.  No improvement with loratadine.  History of surgical removal of uvula in the past.  Also has numbness on the back of his tongue and in his throat.  Patient has seen Dr. Tang in the past was very pleased with this consult I will place a referral back to ENT.      Services suggested: No services needed at this time  Health Care Screening: Age-appropriate preventive services recommended by USPTF and ACIP covered by Medicare were discussed today. Services ordered if indicated and agreed upon by the patient.  Referrals offered: Community-based lifestyle interventions to reduce health risks and promote self-management and wellness, fall prevention, nutrition, physical activity, tobacco-use cessation, weight loss, and mental health services as per orders if indicated.    Discussion today about general wellness and lifestyle habits:    · Prevent falls and reduce trip hazards; Cautioned about securing or removing rugs.  · Have a working fire alarm and carbon monoxide detector;   · Engage in regular physical activity and social activities     Follow-up: No Follow-up on file.

## 2019-07-17 RX ORDER — LEVOCETIRIZINE DIHYDROCHLORIDE 5 MG/1
1 TABLET, FILM COATED ORAL DAILY
Qty: 90 TAB | Refills: 3 | Status: SHIPPED | OUTPATIENT
Start: 2019-07-17 | End: 2019-07-18 | Stop reason: SDUPTHER

## 2019-07-18 RX ORDER — MONTELUKAST SODIUM 10 MG/1
10 TABLET ORAL DAILY
Qty: 90 TAB | Refills: 3 | Status: SHIPPED | OUTPATIENT
Start: 2019-07-18 | End: 2019-09-11

## 2019-07-18 RX ORDER — LEVOCETIRIZINE DIHYDROCHLORIDE 5 MG/1
1 TABLET, FILM COATED ORAL DAILY
Qty: 90 TAB | Refills: 3 | Status: SHIPPED | OUTPATIENT
Start: 2019-07-18 | End: 2020-01-01

## 2019-09-11 ENCOUNTER — OFFICE VISIT (OUTPATIENT)
Dept: MEDICAL GROUP | Facility: PHYSICIAN GROUP | Age: 69
End: 2019-09-11
Payer: MEDICARE

## 2019-09-11 VITALS
DIASTOLIC BLOOD PRESSURE: 98 MMHG | HEIGHT: 68 IN | TEMPERATURE: 98.9 F | BODY MASS INDEX: 22.28 KG/M2 | SYSTOLIC BLOOD PRESSURE: 166 MMHG | WEIGHT: 147 LBS | OXYGEN SATURATION: 97 % | HEART RATE: 68 BPM | RESPIRATION RATE: 14 BRPM

## 2019-09-11 DIAGNOSIS — N52.9 ERECTILE DYSFUNCTION, UNSPECIFIED ERECTILE DYSFUNCTION TYPE: ICD-10-CM

## 2019-09-11 DIAGNOSIS — Z11.59 NEED FOR HEPATITIS C SCREENING TEST: ICD-10-CM

## 2019-09-11 PROCEDURE — 99214 OFFICE O/P EST MOD 30 MIN: CPT | Performed by: FAMILY MEDICINE

## 2019-09-11 RX ORDER — SILDENAFIL 100 MG/1
100 TABLET, FILM COATED ORAL PRN
Qty: 10 TAB | Refills: 3 | Status: SHIPPED | OUTPATIENT
Start: 2019-09-11 | End: 2019-09-19 | Stop reason: SDUPTHER

## 2019-09-11 NOTE — PROGRESS NOTES
"Subjective:   Cem Esquivel is a 69 y.o. male here today for evaluation and management of:     ED (erectile dysfunction)  ED since last 2 weeks. Trouble maintaining an erection longer than a few minutes. Causing him anxiety and his bp is elevated at today's visit.   He has no known cardiac conditions. Advised on side effects.   Exam normal. Reassurance provided. Patient notes his anxiety went from 100 to 2!         Current medicines (including changes today)  Current Outpatient Medications   Medication Sig Dispense Refill   • sildenafil citrate (VIAGRA) 100 MG tablet Take 1 Tab by mouth as needed for Erectile Dysfunction. 10 Tab 3   • Levocetirizine Dihydrochloride 5 MG Tab Take 1 Tab by mouth every day. 90 Tab 3     No current facility-administered medications for this visit.      He  has a past medical history of Diverticulitis and Pulmonary nodule.    ROS  No chest pain, no shortness of breath, no abdominal pain       Objective:     BP (!) 166/98 (BP Location: Left arm, Patient Position: Sitting, BP Cuff Size: Adult)   Pulse 68   Temp 37.2 °C (98.9 °F) (Temporal)   Resp 14   Ht 1.727 m (5' 8\")   Wt 66.7 kg (147 lb)   SpO2 97%  Body mass index is 22.35 kg/m².   Physical Exam:  Constitutional: Alert, no distress.  Skin: Warm, dry, good turgor, no rashes in visible areas.  Eye: Equal, round and reactive, conjunctiva clear, lids normal.  ENMT: Lips without lesions, good dentition, oropharynx clear.  Neck: Trachea midline, no masses, no thyromegaly. No cervical or supraclavicular lymphadenopathy  Respiratory: Unlabored respiratory effort, lungs clear to auscultation, no wheezes, no ronchi.  Cardiovascular: Normal S1, S2, no murmur, no edema.  Abdomen: Soft, non-tender, no masses, no hepatosplenomegaly.  Psych: Alert and oriented x3, normal affect and mood.   exam: normal penis no lesions, normal testis b/l no masses   No inguinal hernia b/l      Assessment and Plan:   The following treatment plan was " discussed    1. Need for hepatitis C screening test  - Hep C Virus Antibody; Future    2. Erectile dysfunction, unspecified erectile dysfunction type  rx for sildenafil provided.       Followup: Return for april annual labs before visit in april/June.

## 2019-09-11 NOTE — ASSESSMENT & PLAN NOTE
ED since last 2 weeks. Trouble maintaining an erection longer than a few minutes. Causing him anxiety and his bp is elevated at today's visit.   He has no known cardiac conditions. Advised on side effects.   Exam normal. Reassurance provided. Patient notes his anxiety went from 100 to 2!

## 2019-09-17 ENCOUNTER — TELEPHONE (OUTPATIENT)
Dept: URGENT CARE | Facility: PHYSICIAN GROUP | Age: 69
End: 2019-09-17

## 2019-09-19 ENCOUNTER — TELEPHONE (OUTPATIENT)
Dept: URGENT CARE | Facility: PHYSICIAN GROUP | Age: 69
End: 2019-09-19

## 2019-09-19 RX ORDER — SILDENAFIL 100 MG/1
100 TABLET, FILM COATED ORAL PRN
Qty: 90 TAB | Refills: 0 | Status: SHIPPED | OUTPATIENT
Start: 2019-09-19 | End: 2020-01-01 | Stop reason: SDUPTHER

## 2019-09-19 NOTE — TELEPHONE ENCOUNTER
Patient called back regarding his viagra and would like 30 tablets or 90 tablets sent over to his pharmacy [facility].     [phone] (home)   Please call back thank you    See last TC

## 2019-12-19 ENCOUNTER — APPOINTMENT (RX ONLY)
Dept: URBAN - METROPOLITAN AREA CLINIC 4 | Facility: CLINIC | Age: 69
Setting detail: DERMATOLOGY
End: 2019-12-19

## 2019-12-19 DIAGNOSIS — L57.0 ACTINIC KERATOSIS: ICD-10-CM

## 2019-12-19 DIAGNOSIS — Z71.89 OTHER SPECIFIED COUNSELING: ICD-10-CM

## 2019-12-19 DIAGNOSIS — L82.1 OTHER SEBORRHEIC KERATOSIS: ICD-10-CM

## 2019-12-19 PROCEDURE — 99202 OFFICE O/P NEW SF 15 MIN: CPT | Mod: 25

## 2019-12-19 PROCEDURE — 17003 DESTRUCT PREMALG LES 2-14: CPT

## 2019-12-19 PROCEDURE — 17000 DESTRUCT PREMALG LESION: CPT

## 2019-12-19 PROCEDURE — ? LIQUID NITROGEN

## 2019-12-19 PROCEDURE — ? COUNSELING

## 2019-12-19 ASSESSMENT — LOCATION DETAILED DESCRIPTION DERM
LOCATION DETAILED: RIGHT DISTAL DORSAL FOREARM
LOCATION DETAILED: RIGHT MEDIAL ZYGOMA
LOCATION DETAILED: LEFT CENTRAL PARIETAL SCALP
LOCATION DETAILED: LEFT PROXIMAL DORSAL FOREARM
LOCATION DETAILED: RIGHT CENTRAL MALAR CHEEK
LOCATION DETAILED: RIGHT CENTRAL PARIETAL SCALP
LOCATION DETAILED: RIGHT SUPERIOR OCCIPITAL SCALP

## 2019-12-19 ASSESSMENT — LOCATION ZONE DERM
LOCATION ZONE: SCALP
LOCATION ZONE: FACE
LOCATION ZONE: ARM

## 2019-12-19 ASSESSMENT — LOCATION SIMPLE DESCRIPTION DERM
LOCATION SIMPLE: RIGHT ZYGOMA
LOCATION SIMPLE: RIGHT FOREARM
LOCATION SIMPLE: RIGHT OCCIPITAL SCALP
LOCATION SIMPLE: LEFT FOREARM
LOCATION SIMPLE: RIGHT CHEEK
LOCATION SIMPLE: SCALP

## 2020-01-01 ENCOUNTER — APPOINTMENT (OUTPATIENT)
Dept: CARDIOLOGY | Facility: MEDICAL CENTER | Age: 70
DRG: 215 | End: 2020-01-01
Attending: INTERNAL MEDICINE
Payer: MEDICARE

## 2020-01-01 ENCOUNTER — APPOINTMENT (OUTPATIENT)
Dept: RADIOLOGY | Facility: MEDICAL CENTER | Age: 70
DRG: 215 | End: 2020-01-01
Attending: INTERNAL MEDICINE
Payer: MEDICARE

## 2020-01-01 ENCOUNTER — APPOINTMENT (OUTPATIENT)
Dept: RADIOLOGY | Facility: MEDICAL CENTER | Age: 70
DRG: 215 | End: 2020-01-01
Attending: EMERGENCY MEDICINE
Payer: MEDICARE

## 2020-01-01 ENCOUNTER — PATIENT OUTREACH (OUTPATIENT)
Dept: SCHEDULING | Facility: IMAGING CENTER | Age: 70
End: 2020-01-01

## 2020-01-01 ENCOUNTER — HOSPITAL ENCOUNTER (INPATIENT)
Facility: MEDICAL CENTER | Age: 70
LOS: 1 days | DRG: 215 | End: 2020-09-24
Attending: EMERGENCY MEDICINE | Admitting: STUDENT IN AN ORGANIZED HEALTH CARE EDUCATION/TRAINING PROGRAM
Payer: MEDICARE

## 2020-01-01 VITALS
BODY MASS INDEX: 22.69 KG/M2 | TEMPERATURE: 95.8 F | SYSTOLIC BLOOD PRESSURE: 60 MMHG | DIASTOLIC BLOOD PRESSURE: 44 MMHG | HEIGHT: 68 IN | WEIGHT: 149.69 LBS

## 2020-01-01 DIAGNOSIS — R57.0 CARDIOGENIC SHOCK (HCC): ICD-10-CM

## 2020-01-01 DIAGNOSIS — I10 HYPERTENSION, UNSPECIFIED TYPE: ICD-10-CM

## 2020-01-01 DIAGNOSIS — I24.9 ACS (ACUTE CORONARY SYNDROME) (HCC): ICD-10-CM

## 2020-01-01 DIAGNOSIS — I46.9 CARDIAC ARREST (HCC): ICD-10-CM

## 2020-01-01 DIAGNOSIS — R79.89 ELEVATED TROPONIN: ICD-10-CM

## 2020-01-01 DIAGNOSIS — R94.31 T WAVE INVERSION IN EKG: Primary | ICD-10-CM

## 2020-01-01 LAB
ACT BLD: 224 SEC (ref 74–137)
ACT BLD: 258 SEC (ref 74–137)
ACTION RANGE TRIGGERED IACRT: YES
ALBUMIN SERPL BCP-MCNC: 4.4 G/DL (ref 3.2–4.9)
ALBUMIN SERPL BCP-MCNC: 4.4 G/DL (ref 3.2–4.9)
ALBUMIN/GLOB SERPL: 1.4 G/DL
ALBUMIN/GLOB SERPL: 1.5 G/DL
ALP SERPL-CCNC: 57 U/L (ref 30–99)
ALP SERPL-CCNC: 58 U/L (ref 30–99)
ALT SERPL-CCNC: 28 U/L (ref 2–50)
ALT SERPL-CCNC: 31 U/L (ref 2–50)
ANION GAP SERPL CALC-SCNC: 12 MMOL/L (ref 7–16)
ANION GAP SERPL CALC-SCNC: 16 MMOL/L (ref 7–16)
APTT PPP: 32.1 SEC (ref 24.7–36)
APTT PPP: 33.1 SEC (ref 24.7–36)
AST SERPL-CCNC: 67 U/L (ref 12–45)
AST SERPL-CCNC: 87 U/L (ref 12–45)
BASE EXCESS BLDA CALC-SCNC: -10 MMOL/L (ref -4–3)
BASE EXCESS BLDA CALC-SCNC: -12 MMOL/L (ref -4–3)
BASE EXCESS BLDA CALC-SCNC: 13 MMOL/L (ref -4–3)
BASOPHILS # BLD AUTO: 0.2 % (ref 0–1.8)
BASOPHILS # BLD AUTO: 0.2 % (ref 0–1.8)
BASOPHILS # BLD: 0.02 K/UL (ref 0–0.12)
BASOPHILS # BLD: 0.02 K/UL (ref 0–0.12)
BILIRUB SERPL-MCNC: 0.9 MG/DL (ref 0.1–1.5)
BILIRUB SERPL-MCNC: 1 MG/DL (ref 0.1–1.5)
BODY TEMPERATURE: ABNORMAL DEGREES
BUN SERPL-MCNC: 12 MG/DL (ref 8–22)
BUN SERPL-MCNC: 13 MG/DL (ref 8–22)
CA-I BLD ISE-SCNC: 0.86 MMOL/L (ref 1.1–1.3)
CA-I BLD ISE-SCNC: 0.92 MMOL/L (ref 1.1–1.3)
CALCIUM SERPL-MCNC: 10 MG/DL (ref 8.5–10.5)
CALCIUM SERPL-MCNC: 10.1 MG/DL (ref 8.5–10.5)
CHLORIDE SERPL-SCNC: 83 MMOL/L (ref 96–112)
CHLORIDE SERPL-SCNC: 86 MMOL/L (ref 96–112)
CHOLEST SERPL-MCNC: 168 MG/DL (ref 100–199)
CK MB SERPL-MCNC: 32.7 NG/ML (ref 0–5)
CO2 BLDA-SCNC: 18 MMOL/L (ref 20–33)
CO2 BLDA-SCNC: 18 MMOL/L (ref 20–33)
CO2 BLDA-SCNC: 42 MMOL/L (ref 20–33)
CO2 SERPL-SCNC: 25 MMOL/L (ref 20–33)
CO2 SERPL-SCNC: 31 MMOL/L (ref 20–33)
COVID ORDER STATUS COVID19: NORMAL
CREAT SERPL-MCNC: 0.62 MG/DL (ref 0.5–1.4)
CREAT SERPL-MCNC: 0.66 MG/DL (ref 0.5–1.4)
EKG IMPRESSION: NORMAL
EOSINOPHIL # BLD AUTO: 0.03 K/UL (ref 0–0.51)
EOSINOPHIL # BLD AUTO: 0.06 K/UL (ref 0–0.51)
EOSINOPHIL NFR BLD: 0.3 % (ref 0–6.9)
EOSINOPHIL NFR BLD: 0.5 % (ref 0–6.9)
ERYTHROCYTE [DISTWIDTH] IN BLOOD BY AUTOMATED COUNT: 43.6 FL (ref 35.9–50)
ERYTHROCYTE [DISTWIDTH] IN BLOOD BY AUTOMATED COUNT: 44.3 FL (ref 35.9–50)
GLOBULIN SER CALC-MCNC: 2.9 G/DL (ref 1.9–3.5)
GLOBULIN SER CALC-MCNC: 3.1 G/DL (ref 1.9–3.5)
GLUCOSE SERPL-MCNC: 120 MG/DL (ref 65–99)
GLUCOSE SERPL-MCNC: 133 MG/DL (ref 65–99)
HCO3 BLDA-SCNC: 16.3 MMOL/L (ref 17–25)
HCO3 BLDA-SCNC: 16.9 MMOL/L (ref 17–25)
HCO3 BLDA-SCNC: 39.8 MMOL/L (ref 17–25)
HCT VFR BLD AUTO: 47.8 % (ref 42–52)
HCT VFR BLD AUTO: 50.8 % (ref 42–52)
HCT VFR BLD CALC: 30 % (ref 42–52)
HCT VFR BLD CALC: 31 % (ref 42–52)
HCT VFR BLD CALC: 36 % (ref 42–52)
HDLC SERPL-MCNC: 72 MG/DL
HGB BLD-MCNC: 10.2 G/DL (ref 14–18)
HGB BLD-MCNC: 10.5 G/DL (ref 14–18)
HGB BLD-MCNC: 12.2 G/DL (ref 14–18)
HGB BLD-MCNC: 16.7 G/DL (ref 14–18)
HGB BLD-MCNC: 17.3 G/DL (ref 14–18)
HOROWITZ INDEX BLDA+IHG-RTO: 53 MM[HG]
HOROWITZ INDEX BLDA+IHG-RTO: 77 MM[HG]
IMM GRANULOCYTES # BLD AUTO: 0.04 K/UL (ref 0–0.11)
IMM GRANULOCYTES # BLD AUTO: 0.05 K/UL (ref 0–0.11)
IMM GRANULOCYTES NFR BLD AUTO: 0.4 % (ref 0–0.9)
IMM GRANULOCYTES NFR BLD AUTO: 0.4 % (ref 0–0.9)
INR PPP: 0.96 (ref 0.87–1.13)
INR PPP: 1.06 (ref 0.87–1.13)
INST. QUALIFIED PATIENT IIQPT: YES
LDLC SERPL CALC-MCNC: 78 MG/DL
LIPASE SERPL-CCNC: 66 U/L (ref 11–82)
LYMPHOCYTES # BLD AUTO: 1.56 K/UL (ref 1–4.8)
LYMPHOCYTES # BLD AUTO: 1.72 K/UL (ref 1–4.8)
LYMPHOCYTES NFR BLD: 14.7 % (ref 22–41)
LYMPHOCYTES NFR BLD: 16.5 % (ref 22–41)
MAGNESIUM SERPL-MCNC: 1.7 MG/DL (ref 1.5–2.5)
MCH RBC QN AUTO: 31.7 PG (ref 27–33)
MCH RBC QN AUTO: 31.9 PG (ref 27–33)
MCHC RBC AUTO-ENTMCNC: 34.1 G/DL (ref 33.7–35.3)
MCHC RBC AUTO-ENTMCNC: 34.9 G/DL (ref 33.7–35.3)
MCV RBC AUTO: 91.2 FL (ref 81.4–97.8)
MCV RBC AUTO: 93 FL (ref 81.4–97.8)
MONOCYTES # BLD AUTO: 0.72 K/UL (ref 0–0.85)
MONOCYTES # BLD AUTO: 0.95 K/UL (ref 0–0.85)
MONOCYTES NFR BLD AUTO: 7.6 % (ref 0–13.4)
MONOCYTES NFR BLD AUTO: 8.1 % (ref 0–13.4)
NEUTROPHILS # BLD AUTO: 7.1 K/UL (ref 1.82–7.42)
NEUTROPHILS # BLD AUTO: 8.94 K/UL (ref 1.82–7.42)
NEUTROPHILS NFR BLD: 75 % (ref 44–72)
NEUTROPHILS NFR BLD: 76.1 % (ref 44–72)
NRBC # BLD AUTO: 0 K/UL
NRBC # BLD AUTO: 0 K/UL
NRBC BLD-RTO: 0 /100 WBC
NRBC BLD-RTO: 0 /100 WBC
NT-PROBNP SERPL IA-MCNC: 1937 PG/ML (ref 0–125)
O2/TOTAL GAS SETTING VFR VENT: 100 %
O2/TOTAL GAS SETTING VFR VENT: 100 %
PCO2 BLDA: 41.9 MMHG (ref 26–37)
PCO2 BLDA: 45.9 MMHG (ref 26–37)
PCO2 BLDA: 63 MMHG (ref 26–37)
PCO2 TEMP ADJ BLDA: 34.3 MMHG (ref 26–37)
PCO2 TEMP ADJ BLDA: 41.3 MMHG (ref 26–37)
PH BLDA: 7.16 [PH] (ref 7.4–7.5)
PH BLDA: 7.21 [PH] (ref 7.4–7.5)
PH BLDA: 7.41 [PH] (ref 7.4–7.5)
PH TEMP ADJ BLDA: 7.22 [PH] (ref 7.4–7.5)
PH TEMP ADJ BLDA: 7.25 [PH] (ref 7.4–7.5)
PLATELET # BLD AUTO: 220 K/UL (ref 164–446)
PLATELET # BLD AUTO: 232 K/UL (ref 164–446)
PMV BLD AUTO: 10.1 FL (ref 9–12.9)
PMV BLD AUTO: 10.4 FL (ref 9–12.9)
PO2 BLDA: 53 MMHG (ref 64–87)
PO2 BLDA: 77 MMHG (ref 64–87)
PO2 BLDA: 92 MMHG (ref 64–87)
PO2 TEMP ADJ BLDA: 33 MMHG (ref 64–87)
PO2 TEMP ADJ BLDA: 75 MMHG (ref 64–87)
POTASSIUM BLD-SCNC: 2.8 MMOL/L (ref 3.6–5.5)
POTASSIUM BLD-SCNC: 3.3 MMOL/L (ref 3.6–5.5)
POTASSIUM SERPL-SCNC: 3.9 MMOL/L (ref 3.6–5.5)
POTASSIUM SERPL-SCNC: 3.9 MMOL/L (ref 3.6–5.5)
PROT SERPL-MCNC: 7.3 G/DL (ref 6–8.2)
PROT SERPL-MCNC: 7.5 G/DL (ref 6–8.2)
PROTHROMBIN TIME: 13.1 SEC (ref 12–14.6)
PROTHROMBIN TIME: 14.1 SEC (ref 12–14.6)
RBC # BLD AUTO: 5.24 M/UL (ref 4.7–6.1)
RBC # BLD AUTO: 5.46 M/UL (ref 4.7–6.1)
SAO2 % BLDA: 77 % (ref 93–99)
SAO2 % BLDA: 92 % (ref 93–99)
SAO2 % BLDA: 97 % (ref 93–99)
SARS-COV-2 RNA RESP QL NAA+PROBE: NOTDETECTED
SODIUM BLD-SCNC: 135 MMOL/L (ref 135–145)
SODIUM BLD-SCNC: 142 MMOL/L (ref 135–145)
SODIUM SERPL-SCNC: 126 MMOL/L (ref 135–145)
SODIUM SERPL-SCNC: 127 MMOL/L (ref 135–145)
SPECIMEN DRAWN FROM PATIENT: ABNORMAL
SPECIMEN SOURCE: NORMAL
TRIGL SERPL-MCNC: 90 MG/DL (ref 0–149)
TROPONIN T SERPL-MCNC: 723 NG/L (ref 6–19)
TROPONIN T SERPL-MCNC: 728 NG/L (ref 6–19)
TROPONIN T SERPL-MCNC: 854 NG/L (ref 6–19)
UFH PPP CHRO-ACNC: <0.1 IU/ML
UFH PPP CHRO-ACNC: <0.1 IU/ML
WBC # BLD AUTO: 11.7 K/UL (ref 4.8–10.8)
WBC # BLD AUTO: 9.5 K/UL (ref 4.8–10.8)

## 2020-01-01 PROCEDURE — 84132 ASSAY OF SERUM POTASSIUM: CPT

## 2020-01-01 PROCEDURE — 700117 HCHG RX CONTRAST REV CODE 255: Performed by: INTERNAL MEDICINE

## 2020-01-01 PROCEDURE — 83690 ASSAY OF LIPASE: CPT

## 2020-01-01 PROCEDURE — 700111 HCHG RX REV CODE 636 W/ 250 OVERRIDE (IP): Performed by: STUDENT IN AN ORGANIZED HEALTH CARE EDUCATION/TRAINING PROGRAM

## 2020-01-01 PROCEDURE — 83735 ASSAY OF MAGNESIUM: CPT

## 2020-01-01 PROCEDURE — 99291 CRITICAL CARE FIRST HOUR: CPT | Mod: 25 | Performed by: INTERNAL MEDICINE

## 2020-01-01 PROCEDURE — 02HV33Z INSERTION OF INFUSION DEVICE INTO SUPERIOR VENA CAVA, PERCUTANEOUS APPROACH: ICD-10-PCS | Performed by: INTERNAL MEDICINE

## 2020-01-01 PROCEDURE — 02HA3RZ INSERTION OF SHORT-TERM EXTERNAL HEART ASSIST SYSTEM INTO HEART, PERCUTANEOUS APPROACH: ICD-10-PCS | Performed by: INTERNAL MEDICINE

## 2020-01-01 PROCEDURE — 99152 MOD SED SAME PHYS/QHP 5/>YRS: CPT | Performed by: INTERNAL MEDICINE

## 2020-01-01 PROCEDURE — 36556 INSERT NON-TUNNEL CV CATH: CPT | Mod: RT | Performed by: INTERNAL MEDICINE

## 2020-01-01 PROCEDURE — 36556 INSERT NON-TUNNEL CV CATH: CPT | Performed by: INTERNAL MEDICINE

## 2020-01-01 PROCEDURE — 71045 X-RAY EXAM CHEST 1 VIEW: CPT

## 2020-01-01 PROCEDURE — 94770 HCHG CO2 EXPIRED GAS DETERMINATION: CPT

## 2020-01-01 PROCEDURE — 83880 ASSAY OF NATRIURETIC PEPTIDE: CPT

## 2020-01-01 PROCEDURE — 85520 HEPARIN ASSAY: CPT

## 2020-01-01 PROCEDURE — 5A0221D ASSISTANCE WITH CARDIAC OUTPUT USING IMPELLER PUMP, CONTINUOUS: ICD-10-PCS | Performed by: INTERNAL MEDICINE

## 2020-01-01 PROCEDURE — 85347 COAGULATION TIME ACTIVATED: CPT

## 2020-01-01 PROCEDURE — 99222 1ST HOSP IP/OBS MODERATE 55: CPT | Mod: AI | Performed by: STUDENT IN AN ORGANIZED HEALTH CARE EDUCATION/TRAINING PROGRAM

## 2020-01-01 PROCEDURE — 5A1935Z RESPIRATORY VENTILATION, LESS THAN 24 CONSECUTIVE HOURS: ICD-10-PCS | Performed by: INTERNAL MEDICINE

## 2020-01-01 PROCEDURE — 700102 HCHG RX REV CODE 250 W/ 637 OVERRIDE(OP): Performed by: NURSE PRACTITIONER

## 2020-01-01 PROCEDURE — 5A1213Z PERFORMANCE OF CARDIAC PACING, INTERMITTENT: ICD-10-PCS | Performed by: INTERNAL MEDICINE

## 2020-01-01 PROCEDURE — 99233 SBSQ HOSP IP/OBS HIGH 50: CPT | Performed by: STUDENT IN AN ORGANIZED HEALTH CARE EDUCATION/TRAINING PROGRAM

## 2020-01-01 PROCEDURE — 99223 1ST HOSP IP/OBS HIGH 75: CPT | Mod: 25 | Performed by: INTERNAL MEDICINE

## 2020-01-01 PROCEDURE — 99292 CRITICAL CARE ADDL 30 MIN: CPT | Mod: 25 | Performed by: INTERNAL MEDICINE

## 2020-01-01 PROCEDURE — 99285 EMERGENCY DEPT VISIT HI MDM: CPT

## 2020-01-01 PROCEDURE — B2151ZZ FLUOROSCOPY OF LEFT HEART USING LOW OSMOLAR CONTRAST: ICD-10-PCS | Performed by: INTERNAL MEDICINE

## 2020-01-01 PROCEDURE — 700102 HCHG RX REV CODE 250 W/ 637 OVERRIDE(OP): Performed by: EMERGENCY MEDICINE

## 2020-01-01 PROCEDURE — 92928 PRQ TCAT PLMT NTRAC ST 1 LES: CPT | Mod: 78,LM | Performed by: INTERNAL MEDICINE

## 2020-01-01 PROCEDURE — 85014 HEMATOCRIT: CPT | Mod: 91

## 2020-01-01 PROCEDURE — 700101 HCHG RX REV CODE 250

## 2020-01-01 PROCEDURE — 82803 BLOOD GASES ANY COMBINATION: CPT

## 2020-01-01 PROCEDURE — 80053 COMPREHEN METABOLIC PANEL: CPT

## 2020-01-01 PROCEDURE — 700111 HCHG RX REV CODE 636 W/ 250 OVERRIDE (IP)

## 2020-01-01 PROCEDURE — 93005 ELECTROCARDIOGRAM TRACING: CPT | Performed by: EMERGENCY MEDICINE

## 2020-01-01 PROCEDURE — 5A2204Z RESTORATION OF CARDIAC RHYTHM, SINGLE: ICD-10-PCS | Performed by: INTERNAL MEDICINE

## 2020-01-01 PROCEDURE — 99153 MOD SED SAME PHYS/QHP EA: CPT

## 2020-01-01 PROCEDURE — 700101 HCHG RX REV CODE 250: Performed by: INTERNAL MEDICINE

## 2020-01-01 PROCEDURE — 80061 LIPID PANEL: CPT

## 2020-01-01 PROCEDURE — A9270 NON-COVERED ITEM OR SERVICE: HCPCS | Performed by: EMERGENCY MEDICINE

## 2020-01-01 PROCEDURE — B2111ZZ FLUOROSCOPY OF MULTIPLE CORONARY ARTERIES USING LOW OSMOLAR CONTRAST: ICD-10-PCS | Performed by: INTERNAL MEDICINE

## 2020-01-01 PROCEDURE — 99233 SBSQ HOSP IP/OBS HIGH 50: CPT | Performed by: THORACIC SURGERY (CARDIOTHORACIC VASCULAR SURGERY)

## 2020-01-01 PROCEDURE — 93308 TTE F-UP OR LMTD: CPT | Mod: 26 | Performed by: INTERNAL MEDICINE

## 2020-01-01 PROCEDURE — 93005 ELECTROCARDIOGRAM TRACING: CPT | Performed by: STUDENT IN AN ORGANIZED HEALTH CARE EDUCATION/TRAINING PROGRAM

## 2020-01-01 PROCEDURE — 93308 TTE F-UP OR LMTD: CPT

## 2020-01-01 PROCEDURE — 770020 HCHG ROOM/CARE - TELE (206)

## 2020-01-01 PROCEDURE — 5A12012 PERFORMANCE OF CARDIAC OUTPUT, SINGLE, MANUAL: ICD-10-PCS | Performed by: INTERNAL MEDICINE

## 2020-01-01 PROCEDURE — 700102 HCHG RX REV CODE 250 W/ 637 OVERRIDE(OP)

## 2020-01-01 PROCEDURE — 4A023N7 MEASUREMENT OF CARDIAC SAMPLING AND PRESSURE, LEFT HEART, PERCUTANEOUS APPROACH: ICD-10-PCS | Performed by: INTERNAL MEDICINE

## 2020-01-01 PROCEDURE — 700111 HCHG RX REV CODE 636 W/ 250 OVERRIDE (IP): Performed by: INTERNAL MEDICINE

## 2020-01-01 PROCEDURE — 85025 COMPLETE CBC W/AUTO DIFF WBC: CPT

## 2020-01-01 PROCEDURE — 33210 INSERT ELECTRD/PM CATH SNGL: CPT | Performed by: INTERNAL MEDICINE

## 2020-01-01 PROCEDURE — 94002 VENT MGMT INPAT INIT DAY: CPT

## 2020-01-01 PROCEDURE — 96374 THER/PROPH/DIAG INJ IV PUSH: CPT

## 2020-01-01 PROCEDURE — 84484 ASSAY OF TROPONIN QUANT: CPT | Mod: 91

## 2020-01-01 PROCEDURE — 33990 INSJ PERQ VAD L HRT ARTERIAL: CPT | Mod: 78 | Performed by: INTERNAL MEDICINE

## 2020-01-01 PROCEDURE — 0BH17EZ INSERTION OF ENDOTRACHEAL AIRWAY INTO TRACHEA, VIA NATURAL OR ARTIFICIAL OPENING: ICD-10-PCS | Performed by: INTERNAL MEDICINE

## 2020-01-01 PROCEDURE — 700102 HCHG RX REV CODE 250 W/ 637 OVERRIDE(OP): Performed by: STUDENT IN AN ORGANIZED HEALTH CARE EDUCATION/TRAINING PROGRAM

## 2020-01-01 PROCEDURE — C9803 HOPD COVID-19 SPEC COLLECT: HCPCS | Performed by: EMERGENCY MEDICINE

## 2020-01-01 PROCEDURE — 85730 THROMBOPLASTIN TIME PARTIAL: CPT

## 2020-01-01 PROCEDURE — U0003 INFECTIOUS AGENT DETECTION BY NUCLEIC ACID (DNA OR RNA); SEVERE ACUTE RESPIRATORY SYNDROME CORONAVIRUS 2 (SARS-COV-2) (CORONAVIRUS DISEASE [COVID-19]), AMPLIFIED PROBE TECHNIQUE, MAKING USE OF HIGH THROUGHPUT TECHNOLOGIES AS DESCRIBED BY CMS-2020-01-R: HCPCS

## 2020-01-01 PROCEDURE — 93010 ELECTROCARDIOGRAM REPORT: CPT | Performed by: INTERNAL MEDICINE

## 2020-01-01 PROCEDURE — 82553 CREATINE MB FRACTION: CPT

## 2020-01-01 PROCEDURE — A9270 NON-COVERED ITEM OR SERVICE: HCPCS | Performed by: NURSE PRACTITIONER

## 2020-01-01 PROCEDURE — 82330 ASSAY OF CALCIUM: CPT | Mod: 91

## 2020-01-01 PROCEDURE — 027035Z DILATION OF CORONARY ARTERY, ONE ARTERY WITH TWO DRUG-ELUTING INTRALUMINAL DEVICES, PERCUTANEOUS APPROACH: ICD-10-PCS | Performed by: INTERNAL MEDICINE

## 2020-01-01 PROCEDURE — 31500 INSERT EMERGENCY AIRWAY: CPT | Performed by: INTERNAL MEDICINE

## 2020-01-01 PROCEDURE — 700105 HCHG RX REV CODE 258: Performed by: INTERNAL MEDICINE

## 2020-01-01 PROCEDURE — A9270 NON-COVERED ITEM OR SERVICE: HCPCS

## 2020-01-01 PROCEDURE — 92950 HEART/LUNG RESUSCITATION CPR: CPT | Performed by: INTERNAL MEDICINE

## 2020-01-01 PROCEDURE — 84484 ASSAY OF TROPONIN QUANT: CPT

## 2020-01-01 PROCEDURE — 85610 PROTHROMBIN TIME: CPT

## 2020-01-01 PROCEDURE — 92950 HEART/LUNG RESUSCITATION CPR: CPT

## 2020-01-01 PROCEDURE — 93458 L HRT ARTERY/VENTRICLE ANGIO: CPT | Mod: 26,59 | Performed by: INTERNAL MEDICINE

## 2020-01-01 PROCEDURE — A9270 NON-COVERED ITEM OR SERVICE: HCPCS | Performed by: STUDENT IN AN ORGANIZED HEALTH CARE EDUCATION/TRAINING PROGRAM

## 2020-01-01 PROCEDURE — 84295 ASSAY OF SERUM SODIUM: CPT

## 2020-01-01 RX ORDER — DOPAMINE HYDROCHLORIDE 160 MG/100ML
0-20 INJECTION, SOLUTION INTRAVENOUS CONTINUOUS
Status: DISCONTINUED | OUTPATIENT
Start: 2020-01-01 | End: 2020-09-25 | Stop reason: HOSPADM

## 2020-01-01 RX ORDER — ACETAMINOPHEN 325 MG/1
650 TABLET ORAL EVERY 6 HOURS PRN
Status: DISCONTINUED | OUTPATIENT
Start: 2020-01-01 | End: 2020-01-01

## 2020-01-01 RX ORDER — HEPARIN SODIUM,PORCINE 1000/ML
VIAL (ML) INJECTION
Status: COMPLETED
Start: 2020-01-01 | End: 2020-01-01

## 2020-01-01 RX ORDER — MIDAZOLAM HYDROCHLORIDE 1 MG/ML
5 INJECTION INTRAMUSCULAR; INTRAVENOUS
Status: DISCONTINUED | OUTPATIENT
Start: 2020-01-01 | End: 2020-01-01

## 2020-01-01 RX ORDER — POTASSIUM CHLORIDE 7.45 MG/ML
INJECTION INTRAVENOUS
Status: DISCONTINUED
Start: 2020-01-01 | End: 2020-09-25 | Stop reason: HOSPADM

## 2020-01-01 RX ORDER — MORPHINE SULFATE 10 MG/ML
10 INJECTION, SOLUTION INTRAMUSCULAR; INTRAVENOUS
Status: DISCONTINUED | OUTPATIENT
Start: 2020-01-01 | End: 2020-09-25 | Stop reason: HOSPADM

## 2020-01-01 RX ORDER — HYDROCHLOROTHIAZIDE 25 MG/1
25 TABLET ORAL DAILY
COMMUNITY

## 2020-01-01 RX ORDER — ONDANSETRON 2 MG/ML
4 INJECTION INTRAMUSCULAR; INTRAVENOUS EVERY 4 HOURS PRN
Status: DISCONTINUED | OUTPATIENT
Start: 2020-01-01 | End: 2020-01-01

## 2020-01-01 RX ORDER — EPINEPHRINE HCL IN 0.9 % NACL 4MG/250ML
0-20 PLASTIC BAG, INJECTION (ML) INTRAVENOUS CONTINUOUS
Status: DISCONTINUED | OUTPATIENT
Start: 2020-01-01 | End: 2020-01-01

## 2020-01-01 RX ORDER — MIDAZOLAM HYDROCHLORIDE 1 MG/ML
INJECTION INTRAMUSCULAR; INTRAVENOUS
Status: COMPLETED
Start: 2020-01-01 | End: 2020-01-01

## 2020-01-01 RX ORDER — ONDANSETRON 4 MG/1
4 TABLET, ORALLY DISINTEGRATING ORAL EVERY 4 HOURS PRN
Status: DISCONTINUED | OUTPATIENT
Start: 2020-01-01 | End: 2020-01-01

## 2020-01-01 RX ORDER — SODIUM CHLORIDE 9 MG/ML
INJECTION, SOLUTION INTRAVENOUS
Status: DISCONTINUED
Start: 2020-01-01 | End: 2020-09-25 | Stop reason: HOSPADM

## 2020-01-01 RX ORDER — ONDANSETRON 2 MG/ML
4 INJECTION INTRAMUSCULAR; INTRAVENOUS ONCE
Status: COMPLETED | OUTPATIENT
Start: 2020-01-01 | End: 2020-01-01

## 2020-01-01 RX ORDER — VERAPAMIL HYDROCHLORIDE 2.5 MG/ML
INJECTION, SOLUTION INTRAVENOUS
Status: COMPLETED
Start: 2020-01-01 | End: 2020-01-01

## 2020-01-01 RX ORDER — HYDROCHLOROTHIAZIDE 25 MG/1
25 TABLET ORAL DAILY
Status: DISCONTINUED | OUTPATIENT
Start: 2020-01-01 | End: 2020-01-01

## 2020-01-01 RX ORDER — LORAZEPAM 2 MG/ML
1 CONCENTRATE ORAL
Status: DISCONTINUED | OUTPATIENT
Start: 2020-01-01 | End: 2020-09-25 | Stop reason: HOSPADM

## 2020-01-01 RX ORDER — ONDANSETRON 2 MG/ML
INJECTION INTRAMUSCULAR; INTRAVENOUS
Status: COMPLETED
Start: 2020-01-01 | End: 2020-01-01

## 2020-01-01 RX ORDER — AMOXICILLIN 250 MG
2 CAPSULE ORAL 2 TIMES DAILY
Status: DISCONTINUED | OUTPATIENT
Start: 2020-01-01 | End: 2020-01-01

## 2020-01-01 RX ORDER — EPINEPHRINE HCL IN 0.9 % NACL 4MG/250ML
0-10 PLASTIC BAG, INJECTION (ML) INTRAVENOUS CONTINUOUS
Status: DISCONTINUED | OUTPATIENT
Start: 2020-01-01 | End: 2020-01-01

## 2020-01-01 RX ORDER — SODIUM CHLORIDE, SODIUM GLUCONATE, SODIUM ACETATE, POTASSIUM CHLORIDE AND MAGNESIUM CHLORIDE 526; 502; 368; 37; 30 MG/100ML; MG/100ML; MG/100ML; MG/100ML; MG/100ML
INJECTION, SOLUTION INTRAVENOUS
Status: DISCONTINUED
Start: 2020-01-01 | End: 2020-09-25 | Stop reason: HOSPADM

## 2020-01-01 RX ORDER — MIDAZOLAM HYDROCHLORIDE 1 MG/ML
2 INJECTION INTRAMUSCULAR; INTRAVENOUS
Status: DISCONTINUED | OUTPATIENT
Start: 2020-01-01 | End: 2020-01-01

## 2020-01-01 RX ORDER — LISINOPRIL 5 MG/1
5 TABLET ORAL
Status: DISCONTINUED | OUTPATIENT
Start: 2020-09-25 | End: 2020-01-01

## 2020-01-01 RX ORDER — ATORVASTATIN CALCIUM 10 MG/1
10 TABLET, FILM COATED ORAL NIGHTLY
COMMUNITY

## 2020-01-01 RX ORDER — ATROPINE SULFATE 10 MG/ML
2 SOLUTION/ DROPS OPHTHALMIC EVERY 4 HOURS PRN
Status: DISCONTINUED | OUTPATIENT
Start: 2020-01-01 | End: 2020-09-25 | Stop reason: HOSPADM

## 2020-01-01 RX ORDER — EPTIFIBATIDE 0.75 MG/ML
2 INJECTION, SOLUTION INTRAVENOUS CONTINUOUS
Status: CANCELLED | OUTPATIENT
Start: 2020-01-01

## 2020-01-01 RX ORDER — IPRATROPIUM BROMIDE AND ALBUTEROL SULFATE 2.5; .5 MG/3ML; MG/3ML
3 SOLUTION RESPIRATORY (INHALATION)
Status: DISCONTINUED | OUTPATIENT
Start: 2020-01-01 | End: 2020-01-01

## 2020-01-01 RX ORDER — CLOPIDOGREL BISULFATE 75 MG/1
75 TABLET ORAL DAILY
Status: CANCELLED | OUTPATIENT
Start: 2020-09-25

## 2020-01-01 RX ORDER — POLYETHYLENE GLYCOL 3350 17 G/17G
1 POWDER, FOR SOLUTION ORAL
Status: DISCONTINUED | OUTPATIENT
Start: 2020-01-01 | End: 2020-01-01

## 2020-01-01 RX ORDER — NITROGLYCERIN 0.4 MG/1
TABLET SUBLINGUAL
Status: COMPLETED
Start: 2020-01-01 | End: 2020-01-01

## 2020-01-01 RX ORDER — EPINEPHRINE IN SOD CHLOR,ISO 1 MG/10 ML
SYRINGE (ML) INTRAVENOUS
Status: COMPLETED | OUTPATIENT
Start: 2020-01-01 | End: 2020-01-01

## 2020-01-01 RX ORDER — BISACODYL 10 MG
10 SUPPOSITORY, RECTAL RECTAL
Status: DISCONTINUED | OUTPATIENT
Start: 2020-01-01 | End: 2020-01-01

## 2020-01-01 RX ORDER — MIDAZOLAM HYDROCHLORIDE 1 MG/ML
1 INJECTION INTRAMUSCULAR; INTRAVENOUS
Status: DISCONTINUED | OUTPATIENT
Start: 2020-01-01 | End: 2020-01-01

## 2020-01-01 RX ORDER — HEPARIN SODIUM 200 [USP'U]/100ML
INJECTION, SOLUTION INTRAVENOUS
Status: COMPLETED
Start: 2020-01-01 | End: 2020-01-01

## 2020-01-01 RX ORDER — EPINEPHRINE IN SOD CHLOR,ISO 1 MG/10 ML
SYRINGE (ML) INTRAVENOUS
Status: DISCONTINUED
Start: 2020-01-01 | End: 2020-09-25 | Stop reason: HOSPADM

## 2020-01-01 RX ORDER — ENALAPRILAT 1.25 MG/ML
1.25 INJECTION INTRAVENOUS EVERY 6 HOURS PRN
Status: DISCONTINUED | OUTPATIENT
Start: 2020-01-01 | End: 2020-01-01

## 2020-01-01 RX ORDER — LISINOPRIL 5 MG/1
5 TABLET ORAL
Status: DISCONTINUED | OUTPATIENT
Start: 2020-01-01 | End: 2020-01-01

## 2020-01-01 RX ORDER — AMIODARONE HYDROCHLORIDE 150 MG/3ML
150 INJECTION, SOLUTION INTRAVENOUS ONCE
Status: COMPLETED | OUTPATIENT
Start: 2020-01-01 | End: 2020-01-01

## 2020-01-01 RX ORDER — POTASSIUM CHLORIDE 7.45 MG/ML
10 INJECTION INTRAVENOUS
Status: DISCONTINUED | OUTPATIENT
Start: 2020-01-01 | End: 2020-01-01

## 2020-01-01 RX ORDER — SODIUM CHLORIDE 9 MG/ML
INJECTION, SOLUTION INTRAVENOUS CONTINUOUS
OUTPATIENT
Start: 2020-01-01

## 2020-01-01 RX ORDER — HEPARIN SODIUM 1000 [USP'U]/ML
4000 INJECTION, SOLUTION INTRAVENOUS; SUBCUTANEOUS ONCE
Status: COMPLETED | OUTPATIENT
Start: 2020-01-01 | End: 2020-01-01

## 2020-01-01 RX ORDER — ACETAMINOPHEN 325 MG/1
650 TABLET ORAL ONCE
Status: COMPLETED | OUTPATIENT
Start: 2020-01-01 | End: 2020-01-01

## 2020-01-01 RX ORDER — ASPIRIN 81 MG/1
81 TABLET, CHEWABLE ORAL DAILY
Status: DISCONTINUED | OUTPATIENT
Start: 2020-09-25 | End: 2020-09-25 | Stop reason: HOSPADM

## 2020-01-01 RX ORDER — MORPHINE SULFATE 4 MG/ML
2-4 INJECTION, SOLUTION INTRAMUSCULAR; INTRAVENOUS
Status: DISCONTINUED | OUTPATIENT
Start: 2020-01-01 | End: 2020-01-01

## 2020-01-01 RX ORDER — LIDOCAINE HYDROCHLORIDE 20 MG/ML
INJECTION, SOLUTION INFILTRATION; PERINEURAL
Status: COMPLETED
Start: 2020-01-01 | End: 2020-01-01

## 2020-01-01 RX ORDER — NITROGLYCERIN 0.4 MG/1
0.4 TABLET SUBLINGUAL ONCE
Status: COMPLETED | OUTPATIENT
Start: 2020-01-01 | End: 2020-01-01

## 2020-01-01 RX ORDER — CLOPIDOGREL 300 MG/1
600 TABLET, FILM COATED ORAL ONCE
Status: CANCELLED | OUTPATIENT
Start: 2020-01-01 | End: 2020-01-01

## 2020-01-01 RX ORDER — HEPARIN SODIUM 1000 [USP'U]/ML
30 INJECTION, SOLUTION INTRAVENOUS; SUBCUTANEOUS PRN
Status: DISCONTINUED | OUTPATIENT
Start: 2020-01-01 | End: 2020-01-01

## 2020-01-01 RX ORDER — MORPHINE SULFATE 10 MG/ML
5 INJECTION, SOLUTION INTRAMUSCULAR; INTRAVENOUS
Status: DISCONTINUED | OUTPATIENT
Start: 2020-01-01 | End: 2020-09-25 | Stop reason: HOSPADM

## 2020-01-01 RX ORDER — ASPIRIN 81 MG/1
324 TABLET, CHEWABLE ORAL ONCE
Status: COMPLETED | OUTPATIENT
Start: 2020-01-01 | End: 2020-01-01

## 2020-01-01 RX ORDER — DOPAMINE HYDROCHLORIDE 160 MG/100ML
INJECTION, SOLUTION INTRAVENOUS
Status: COMPLETED
Start: 2020-01-01 | End: 2020-01-01

## 2020-01-01 RX ORDER — FAMOTIDINE 20 MG/1
20 TABLET, FILM COATED ORAL EVERY 12 HOURS
Status: DISCONTINUED | OUTPATIENT
Start: 2020-01-01 | End: 2020-01-01

## 2020-01-01 RX ORDER — LORAZEPAM 2 MG/ML
1 INJECTION INTRAMUSCULAR
Status: DISCONTINUED | OUTPATIENT
Start: 2020-01-01 | End: 2020-09-25 | Stop reason: HOSPADM

## 2020-01-01 RX ORDER — NOREPINEPHRINE BITARTRATE 0.03 MG/ML
0-30 INJECTION, SOLUTION INTRAVENOUS CONTINUOUS
Status: DISCONTINUED | OUTPATIENT
Start: 2020-01-01 | End: 2020-01-01

## 2020-01-01 RX ORDER — HEPARIN SODIUM 5000 [USP'U]/100ML
0-30 INJECTION, SOLUTION INTRAVENOUS CONTINUOUS
Status: DISCONTINUED | OUTPATIENT
Start: 2020-01-01 | End: 2020-01-01

## 2020-01-01 RX ORDER — SODIUM BICARBONATE IN D5W 150/1000ML
PLASTIC BAG, INJECTION (ML) INTRAVENOUS CONTINUOUS
Status: DISCONTINUED | OUTPATIENT
Start: 2020-01-01 | End: 2020-01-01

## 2020-01-01 RX ADMIN — MIDAZOLAM HYDROCHLORIDE 1 MG: 1 INJECTION, SOLUTION INTRAMUSCULAR; INTRAVENOUS at 15:15

## 2020-01-01 RX ADMIN — EPINEPHRINE 1 MG: 0.1 INJECTION, SOLUTION ENDOTRACHEAL; INTRACARDIAC; INTRAVENOUS at 15:52

## 2020-01-01 RX ADMIN — EPINEPHRINE 1 MG: 0.1 INJECTION, SOLUTION ENDOTRACHEAL; INTRACARDIAC; INTRAVENOUS at 15:38

## 2020-01-01 RX ADMIN — EPINEPHRINE 2 MCG/MIN: 1 INJECTION INTRAMUSCULAR; INTRAVENOUS; SUBCUTANEOUS at 16:14

## 2020-01-01 RX ADMIN — HEPARIN SODIUM 12 UNITS/KG/HR: 5000 INJECTION, SOLUTION INTRAVENOUS at 09:09

## 2020-01-01 RX ADMIN — IOHEXOL 17 ML: 350 INJECTION, SOLUTION INTRAVENOUS at 15:16

## 2020-01-01 RX ADMIN — FENTANYL CITRATE 100 MCG: 50 INJECTION INTRAMUSCULAR; INTRAVENOUS at 17:34

## 2020-01-01 RX ADMIN — MIDAZOLAM HYDROCHLORIDE 2 MG: 1 INJECTION, SOLUTION INTRAMUSCULAR; INTRAVENOUS at 18:02

## 2020-01-01 RX ADMIN — ONDANSETRON 4 MG: 2 INJECTION INTRAMUSCULAR; INTRAVENOUS at 22:03

## 2020-01-01 RX ADMIN — HEPARIN SODIUM: 1000 INJECTION, SOLUTION INTRAVENOUS; SUBCUTANEOUS at 14:52

## 2020-01-01 RX ADMIN — ONDANSETRON 4 MG: 4 TABLET, ORALLY DISINTEGRATING ORAL at 09:20

## 2020-01-01 RX ADMIN — ACETAMINOPHEN 650 MG: 325 TABLET, FILM COATED ORAL at 21:20

## 2020-01-01 RX ADMIN — DOPAMINE HYDROCHLORIDE 5 MCG/KG/MIN: 160 INJECTION, SOLUTION INTRAVENOUS at 16:22

## 2020-01-01 RX ADMIN — ONDANSETRON 4 MG: 4 TABLET, ORALLY DISINTEGRATING ORAL at 00:45

## 2020-01-01 RX ADMIN — NITROGLYCERIN 0.4 MG: 0.4 TABLET SUBLINGUAL at 22:15

## 2020-01-01 RX ADMIN — ASPIRIN 81 MG: 81 TABLET, COATED ORAL at 05:18

## 2020-01-01 RX ADMIN — FENTANYL CITRATE 25 MCG: 50 INJECTION INTRAMUSCULAR; INTRAVENOUS at 15:15

## 2020-01-01 RX ADMIN — EPINEPHRINE 1 MG: 0.1 INJECTION, SOLUTION ENDOTRACHEAL; INTRACARDIAC; INTRAVENOUS at 15:59

## 2020-01-01 RX ADMIN — AMIODARONE HYDROCHLORIDE 150 MG: 50 INJECTION, SOLUTION INTRAVENOUS at 16:42

## 2020-01-01 RX ADMIN — LISINOPRIL 5 MG: 5 TABLET ORAL at 05:18

## 2020-01-01 RX ADMIN — NITROGLYCERIN 0.4 MG: 0.4 TABLET, ORALLY DISINTEGRATING SUBLINGUAL at 22:15

## 2020-01-01 RX ADMIN — EPINEPHRINE 1 MG: 0.1 INJECTION, SOLUTION ENDOTRACHEAL; INTRACARDIAC; INTRAVENOUS at 15:42

## 2020-01-01 RX ADMIN — LIDOCAINE HYDROCHLORIDE: 20 INJECTION, SOLUTION INFILTRATION; PERINEURAL at 14:52

## 2020-01-01 RX ADMIN — NITROGLYCERIN 10 ML: 20 INJECTION INTRAVENOUS at 14:52

## 2020-01-01 RX ADMIN — HYDROCHLOROTHIAZIDE 25 MG: 25 TABLET ORAL at 05:18

## 2020-01-01 RX ADMIN — HEPARIN SODIUM: 1000 INJECTION, SOLUTION INTRAVENOUS; SUBCUTANEOUS at 17:58

## 2020-01-01 RX ADMIN — NOREPINEPHRINE BITARTRATE 10 MCG/MIN: 1 INJECTION, SOLUTION, CONCENTRATE INTRAVENOUS at 19:11

## 2020-01-01 RX ADMIN — MIDAZOLAM HYDROCHLORIDE 2 MG: 1 INJECTION INTRAMUSCULAR; INTRAVENOUS at 18:00

## 2020-01-01 RX ADMIN — HEPARIN SODIUM 2000 UNITS: 200 INJECTION, SOLUTION INTRAVENOUS at 14:53

## 2020-01-01 RX ADMIN — AMIODARONE HYDROCHLORIDE 1 MG/MIN: 1.8 INJECTION, SOLUTION INTRAVENOUS at 17:28

## 2020-01-01 RX ADMIN — HEPARIN SODIUM 4000 UNITS: 1000 INJECTION, SOLUTION INTRAVENOUS; SUBCUTANEOUS at 09:09

## 2020-01-01 RX ADMIN — EPINEPHRINE 1 MG: 0.1 INJECTION, SOLUTION ENDOTRACHEAL; INTRACARDIAC; INTRAVENOUS at 15:48

## 2020-01-01 RX ADMIN — MORPHINE SULFATE 4 MG: 4 INJECTION INTRAVENOUS at 01:33

## 2020-01-01 RX ADMIN — EPINEPHRINE 1 MG: 0.1 INJECTION, SOLUTION ENDOTRACHEAL; INTRACARDIAC; INTRAVENOUS at 15:55

## 2020-01-01 RX ADMIN — EPINEPHRINE 1 MG: 0.1 INJECTION, SOLUTION ENDOTRACHEAL; INTRACARDIAC; INTRAVENOUS at 15:34

## 2020-01-01 RX ADMIN — METOPROLOL TARTRATE 25 MG: 25 TABLET, FILM COATED ORAL at 05:18

## 2020-01-01 RX ADMIN — VERAPAMIL HYDROCHLORIDE 2.5 MG: 5 INJECTION INTRAVENOUS at 14:52

## 2020-01-01 RX ADMIN — HEPARIN SODIUM 12 UNITS/KG/HR: 5000 INJECTION, SOLUTION INTRAVENOUS at 18:54

## 2020-01-01 RX ADMIN — SODIUM BICARBONATE 100 MEQ: 84 INJECTION, SOLUTION INTRAVENOUS at 16:06

## 2020-01-01 RX ADMIN — EPINEPHRINE 20 MCG/MIN: 1 INJECTION INTRAMUSCULAR; INTRAVENOUS; SUBCUTANEOUS at 19:11

## 2020-01-01 RX ADMIN — POTASSIUM CHLORIDE 10 MEQ: 10 INJECTION, SOLUTION INTRAVENOUS at 16:18

## 2020-01-01 RX ADMIN — ASPIRIN 324 MG: 81 TABLET, CHEWABLE ORAL at 22:18

## 2020-01-01 RX ADMIN — LIDOCAINE HYDROCHLORIDE 30 ML: 20 SOLUTION OROPHARYNGEAL at 05:18

## 2020-01-01 RX ADMIN — EPINEPHRINE 1 MG: 0.1 INJECTION, SOLUTION ENDOTRACHEAL; INTRACARDIAC; INTRAVENOUS at 16:01

## 2020-01-01 RX ADMIN — POTASSIUM CHLORIDE 10 MEQ: 10 INJECTION, SOLUTION INTRAVENOUS at 18:32

## 2020-01-01 RX ADMIN — MIDAZOLAM HYDROCHLORIDE 2 MG: 1 INJECTION, SOLUTION INTRAMUSCULAR; INTRAVENOUS at 18:00

## 2020-01-01 RX ADMIN — SODIUM BICARBONATE 50 MEQ: 84 INJECTION, SOLUTION INTRAVENOUS at 15:54

## 2020-01-01 RX ADMIN — METOPROLOL TARTRATE 25 MG: 25 TABLET, FILM COATED ORAL at 00:08

## 2020-01-01 RX ADMIN — HEPARIN SODIUM: 1000 INJECTION, SOLUTION INTRAVENOUS; SUBCUTANEOUS at 17:59

## 2020-01-01 ASSESSMENT — ENCOUNTER SYMPTOMS
PND: 0
PALPITATIONS: 0
VOMITING: 0
FALLS: 0
NAUSEA: 1
DEPRESSION: 0
NAUSEA: 1
HEARTBURN: 0
SORE THROAT: 0
PND: 0
SPUTUM PRODUCTION: 0
ORTHOPNEA: 0
NECK PAIN: 0
BLURRED VISION: 0
VOMITING: 0
LOSS OF CONSCIOUSNESS: 0
ABDOMINAL PAIN: 0
MYALGIAS: 1
DOUBLE VISION: 0
CARDIOVASCULAR NEGATIVE: 1
FOCAL WEAKNESS: 0
MYALGIAS: 1
PALPITATIONS: 0
FEVER: 0
CHILLS: 0
SHORTNESS OF BREATH: 0
HEADACHES: 0
ORTHOPNEA: 0
CLAUDICATION: 0
DIZZINESS: 0
COUGH: 0
ABDOMINAL PAIN: 0

## 2020-01-01 ASSESSMENT — COGNITIVE AND FUNCTIONAL STATUS - GENERAL
DAILY ACTIVITIY SCORE: 24
SUGGESTED CMS G CODE MODIFIER DAILY ACTIVITY: CH
MOBILITY SCORE: 24
SUGGESTED CMS G CODE MODIFIER MOBILITY: CH

## 2020-01-01 ASSESSMENT — LIFESTYLE VARIABLES
ALCOHOL_USE: NO
HAVE PEOPLE ANNOYED YOU BY CRITICIZING YOUR DRINKING: NO
CONSUMPTION TOTAL: NEGATIVE
HOW MANY TIMES IN THE PAST YEAR HAVE YOU HAD 5 OR MORE DRINKS IN A DAY: 0
TOTAL SCORE: 0
TOTAL SCORE: 0
EVER FELT BAD OR GUILTY ABOUT YOUR DRINKING: NO
EVER HAD A DRINK FIRST THING IN THE MORNING TO STEADY YOUR NERVES TO GET RID OF A HANGOVER: NO
HAVE YOU EVER FELT YOU SHOULD CUT DOWN ON YOUR DRINKING: NO
DOES PATIENT WANT TO STOP DRINKING: NO
TOTAL SCORE: 0
AVERAGE NUMBER OF DAYS PER WEEK YOU HAVE A DRINK CONTAINING ALCOHOL: 0
ON A TYPICAL DAY WHEN YOU DRINK ALCOHOL HOW MANY DRINKS DO YOU HAVE: 0

## 2020-01-01 ASSESSMENT — PAIN DESCRIPTION - PAIN TYPE: TYPE: ACUTE PAIN

## 2020-01-01 ASSESSMENT — PATIENT HEALTH QUESTIONNAIRE - PHQ9
2. FEELING DOWN, DEPRESSED, IRRITABLE, OR HOPELESS: NOT AT ALL
SUM OF ALL RESPONSES TO PHQ9 QUESTIONS 1 AND 2: 0
1. LITTLE INTEREST OR PLEASURE IN DOING THINGS: NOT AT ALL

## 2020-01-01 ASSESSMENT — FIBROSIS 4 INDEX: FIB4 SCORE: 5.04

## 2020-08-10 ENCOUNTER — HOSPITAL ENCOUNTER (OUTPATIENT)
Dept: HOSPITAL 8 - CFH | Age: 70
Discharge: HOME | End: 2020-08-10
Attending: NURSE PRACTITIONER
Payer: MEDICARE

## 2020-08-10 DIAGNOSIS — I77.811: Primary | ICD-10-CM

## 2020-08-10 DIAGNOSIS — I70.8: ICD-10-CM

## 2020-08-10 DIAGNOSIS — Z87.891: ICD-10-CM

## 2020-08-10 PROCEDURE — 76706 US ABDL AORTA SCREEN AAA: CPT

## 2020-09-17 ENCOUNTER — HOSPITAL ENCOUNTER (EMERGENCY)
Dept: HOSPITAL 8 - ED | Age: 70
Discharge: HOME | End: 2020-09-17
Payer: MEDICARE

## 2020-09-17 VITALS — SYSTOLIC BLOOD PRESSURE: 172 MMHG | DIASTOLIC BLOOD PRESSURE: 92 MMHG

## 2020-09-17 VITALS — HEIGHT: 64 IN | BODY MASS INDEX: 24.84 KG/M2 | WEIGHT: 145.51 LBS

## 2020-09-17 DIAGNOSIS — E16.2: Primary | ICD-10-CM

## 2020-09-17 DIAGNOSIS — I10: ICD-10-CM

## 2020-09-17 DIAGNOSIS — R53.83: ICD-10-CM

## 2020-09-17 LAB
ALBUMIN SERPL-MCNC: 3.6 G/DL (ref 3.4–5)
ALP SERPL-CCNC: 56 U/L (ref 45–117)
ALT SERPL-CCNC: 33 U/L (ref 12–78)
ANION GAP SERPL CALC-SCNC: 4 MMOL/L (ref 5–15)
BASOPHILS # BLD AUTO: 0.04 X10^3/UL (ref 0–0.1)
BASOPHILS NFR BLD AUTO: 1 % (ref 0–1)
BILIRUB SERPL-MCNC: 0.6 MG/DL (ref 0.2–1)
CALCIUM SERPL-MCNC: 9.4 MG/DL (ref 8.5–10.1)
CHLORIDE SERPL-SCNC: 103 MMOL/L (ref 98–107)
CREAT SERPL-MCNC: 0.87 MG/DL (ref 0.7–1.3)
EOSINOPHIL # BLD AUTO: 0.08 X10^3/UL (ref 0–0.4)
EOSINOPHIL NFR BLD AUTO: 1 % (ref 1–7)
ERYTHROCYTE [DISTWIDTH] IN BLOOD BY AUTOMATED COUNT: 13.9 % (ref 9.4–14.8)
LYMPHOCYTES # BLD AUTO: 1.43 X10^3/UL (ref 1–3.4)
LYMPHOCYTES NFR BLD AUTO: 21 % (ref 22–44)
MCH RBC QN AUTO: 31.2 PG (ref 27.5–34.5)
MCHC RBC AUTO-ENTMCNC: 32.7 G/DL (ref 33.2–36.2)
MCV RBC AUTO: 95.2 FL (ref 81–97)
MD: NO
MICROSCOPIC: (no result)
MONOCYTES # BLD AUTO: 0.56 X10^3/UL (ref 0.2–0.8)
MONOCYTES NFR BLD AUTO: 8 % (ref 2–9)
NEUTROPHILS # BLD AUTO: 4.6 X10^3/UL (ref 1.8–6.8)
NEUTROPHILS NFR BLD AUTO: 69 % (ref 42–75)
PLATELET # BLD AUTO: 213 X10^3/UL (ref 130–400)
PMV BLD AUTO: 8.1 FL (ref 7.4–10.4)
PROT SERPL-MCNC: 7.1 G/DL (ref 6.4–8.2)
RBC # BLD AUTO: 4.96 X10^6/UL (ref 4.38–5.82)

## 2020-09-17 PROCEDURE — 85025 COMPLETE CBC W/AUTO DIFF WBC: CPT

## 2020-09-17 PROCEDURE — 80053 COMPREHEN METABOLIC PANEL: CPT

## 2020-09-17 PROCEDURE — 99283 EMERGENCY DEPT VISIT LOW MDM: CPT

## 2020-09-17 PROCEDURE — 82962 GLUCOSE BLOOD TEST: CPT

## 2020-09-17 PROCEDURE — 36415 COLL VENOUS BLD VENIPUNCTURE: CPT

## 2020-09-17 PROCEDURE — 81003 URINALYSIS AUTO W/O SCOPE: CPT

## 2020-09-17 NOTE — NUR
URINE COLLECTED/SENT TO LAB.  VSS/UPDATED IN COMPUTER.  WARM BLANKET PROVIDED, 
CALL LIGHT WITHIN REACH.

## 2020-09-23 PROBLEM — R94.31 T WAVE INVERSION IN EKG: Status: ACTIVE | Noted: 2020-01-01

## 2020-09-23 PROBLEM — I24.9 ACS (ACUTE CORONARY SYNDROME) (HCC): Status: ACTIVE | Noted: 2020-01-01

## 2020-09-23 PROBLEM — R79.89 ELEVATED TROPONIN LEVEL: Status: ACTIVE | Noted: 2020-01-01

## 2020-09-23 PROBLEM — I10 BENIGN ESSENTIAL HTN: Status: ACTIVE | Noted: 2020-01-01

## 2020-09-23 PROBLEM — R74.01 TRANSAMINITIS: Status: ACTIVE | Noted: 2020-01-01

## 2020-09-24 PROBLEM — I47.20 VENTRICULAR TACHYCARDIA (HCC): Status: ACTIVE | Noted: 2020-01-01

## 2020-09-24 PROBLEM — I25.10 CAD (CORONARY ARTERY DISEASE): Status: ACTIVE | Noted: 2020-01-01

## 2020-09-24 PROBLEM — I46.9 CARDIAC ARREST (HCC): Status: ACTIVE | Noted: 2020-01-01

## 2020-09-24 PROBLEM — R57.0 CARDIOGENIC SHOCK (HCC): Status: ACTIVE | Noted: 2020-01-01

## 2020-09-24 NOTE — PROGRESS NOTES
2 RN Skin Check    2 RN skin check complete with MANNY Sapp.  Devices in place: N/A- RA.  Skin assessed under devices: N\A.  Confirmed pressure ulcers found on: None.  New potential pressure ulcers noted on None. Wound consult placed No.  The following interventions in place Pillows, extra blankets.      Bilateral ears are pink and blanching  Bilateral elbows are dry, but pink and blanching. Small scab on right anterior upper arm.   Sacrum is pink and blanching.   Bilateral heals are dry, pink and blanching.

## 2020-09-24 NOTE — PROGRESS NOTES
Received report from MANNY Mcnair. Reviewed POC, no questions at this time. Call light is within reach, bed is in lowest/locked position.

## 2020-09-24 NOTE — ED NOTES
All labs have been sent.   Pt denies cardiac hx besides HTN. Recently (last Friday) stopped taking Atorvastatin, takes HZTC daily.

## 2020-09-24 NOTE — ASSESSMENT & PLAN NOTE
Patient with AST of 85 present on admission  Discontinued Atorvastatin 4 days ago after 25 days of use  Will continue to monitor

## 2020-09-24 NOTE — ED TRIAGE NOTES
Patient brought in by ems for nausea for the past 4 days.  Patient has had generalized body aches and hypertension.  Patient has been taking his medication as prescribed.  185/104 on arrival.  Patient received 4mg zofran prior to arrival.  Denies pain and nausea has improved at this time.  Denies exposure to anyone with covid.  Ambulatory with steady gait.  Complains of muscle soreness and joint pain.

## 2020-09-24 NOTE — CARE PLAN
Problem: Pain Management  Goal: Pain level will decrease to patient's comfort goal  Outcome: PROGRESSING AS EXPECTED     Problem: Communication  Goal: The ability to communicate needs accurately and effectively will improve  Outcome: PROGRESSING AS EXPECTED     Problem: Safety  Goal: Will remain free from injury  Outcome: PROGRESSING AS EXPECTED  Goal: Will remain free from falls  Outcome: PROGRESSING AS EXPECTED     Problem: Infection  Goal: Will remain free from infection  Outcome: PROGRESSING AS EXPECTED     Problem: Venous Thromboembolism (VTW)/Deep Vein Thrombosis (DVT) Prevention:  Goal: Patient will participate in Venous Thrombosis (VTE)/Deep Vein Thrombosis (DVT)Prevention Measures  Outcome: PROGRESSING AS EXPECTED     Problem: Bowel/Gastric:  Goal: Normal bowel function is maintained or improved  Outcome: PROGRESSING AS EXPECTED  Goal: Will not experience complications related to bowel motility  Outcome: PROGRESSING AS EXPECTED     Problem: Knowledge Deficit  Goal: Knowledge of disease process/condition, treatment plan, diagnostic tests, and medications will improve  Outcome: PROGRESSING AS EXPECTED  Goal: Knowledge of the prescribed therapeutic regimen will improve  Outcome: PROGRESSING AS EXPECTED     Problem: Discharge Barriers/Planning  Goal: Patient's continuum of care needs will be met  Outcome: PROGRESSING AS EXPECTED

## 2020-09-24 NOTE — PROCEDURES
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    Findings:  1.  Left main coronary artery:  95% stenosis proximal left main.  2.  Left anterior descending artery: Luminal irregularities, 2 diagonal branches are noted moderate sized 3.  Left circumflex coronary artery: Gives 1 large high marginal/ramus branch   4.  Right coronary artery: 99% ostial RCA disease, 90% distal RCA disease.  This is a right dominant system.  5.  Left ventricular end diastolic pressure:  8mmHg.  No signficant gradient across the aortic valve.  6.  Left ventriculogram:  Ejection fraction of 45% with inferior hypokinesis.      EBL: <10 CC    Specimens: None    Procedure details:  The risks and benefits of cardiac catheterization and possible intervention were explained to the patient including death, heart attack, stroke, and emergency surgery.  The patient verbalized understanding and wished to proceed.  The patient was brought to the cardiac catheterization laboratory in the fasting state and prepped and draped in the usual sterile fashion.  The right wrist was locally anesthetized with lidocaine and the right radial artery was cannulated with 5/6-[place] equipment and standard radial cocktail was given.  Coronary angiography was performed using JR 4 and JL 3.5  diagnostic catheters in the usual fashion, results mentioned below.  JR 4 catheter was used to cross the aortic valve to perform  left heart catheterization and left ventriculogram.    Once all the views were obtained, all wires and catheters were removed from the patient without difficulty.  A Vasc-Band was placed over  the right radial artery and the radial artery sheath was removed without difficulty.      Complications:  None    Sedation time:  I supervised moderate sedation over a trained independent nursing staff,  Sedation Start time:  13:40   Sedation Stop time: 14:01      PRABHU Morocho  [facility]

## 2020-09-24 NOTE — ASSESSMENT & PLAN NOTE
EKG showing PAC's, T wave inversion at inferior leads with elevated CKMB and elevated Troponin  LHC planned today

## 2020-09-24 NOTE — ASSESSMENT & PLAN NOTE
-Patient was started on Atorvastatin one month ago by PCP, and self discontinued this 4 days ago due to muscle aches and pains for the past 2 weeeks  -Held Atorvastatin currently

## 2020-09-24 NOTE — ED NOTES
Lab called with critical result of troponin at 728. Critical lab result read back.   Dr. Bolton notified of critical lab result at 728.  Critical lab result read back by Dr. Bolton.

## 2020-09-24 NOTE — ASSESSMENT & PLAN NOTE
Troponin of 728 on admission  Serial Troponins ordered, Cardiology consulted  Patient is admitted for ACS

## 2020-09-24 NOTE — PROGRESS NOTES
Bedside report received from night RN. Pt sleeping. No distress on room air. Bed in lowest position. Call light and belongings within reach

## 2020-09-24 NOTE — PROGRESS NOTES
Notified of lab Troponin being 854, alerted DELMER Albarado. No new orders at this time, will continue to monitor.

## 2020-09-24 NOTE — ASSESSMENT & PLAN NOTE
Normal chest xray  Dr. Elena consult appreciated - [facility] planned for today  ACS protocol in place, serial troponins and EKG  Asymptomatic  Nausea resolved with Zofran  ASA and heparin gtt

## 2020-09-24 NOTE — ED NOTES
Med rec updated and complete. Allergies reviewed.  Met with pt at bedside. Dicussed current medications. Pt stated that he was told on Friday by his PCP to stop taking  His atorvastatin .      Home pharmacy [facility].

## 2020-09-24 NOTE — ED NOTES
ERP at bedside. VO for Nitroglycerin 0.4mg and 4mg Zofran stat. Pulled and administered. Pt takes Viagra- last dosage was last Saturday- ERP made aware.

## 2020-09-24 NOTE — H&P
"Hospital Medicine History & Physical Note    Date of Service  9/23/2020    Primary Care Physician  LUKAS Maynard.    Consultant  Dr. Elena (Cardiology)    Code Status  Full Code    Chief Complaint  Chief Complaint   Patient presents with   • Nausea   • Generalized Body Aches   • Hypertension       History of Presenting Illness  70 y.o. male with a past medical history of HTN, HLD, 35 pyhx quit smoking in 08/07, family hx of CAD and CHF, who presented to the ER by EMS this evening due to \"nausea, hot flashes, and irritability\" after checking his BP at home, and saw that it was 190/120.  Patient states that he has been feeling nauseous since yesterday, and has been sore in his b/l upper extremities, but did not think much about this until this afternoon when he suddenly began feeling much worse as described above. Patient was seen to have elevated Troponin, and CKMB in the ER and Dr. Elena (cardiology) is consulted.  EKG was performed showing multiple premature atrial complexes, and T wave inversion at inferior leads.   Chest xray is performed in the ER showing no acute cardiopulmonary abnormalities.  Patient is admitted for management of ACS.  Patient denies chest pain or discomfort with exercise, headache, sweating, but describes resolution of nausea following Zofran given in the ER.  Patient denies current  muscle pain or pain at extremities.        Review of Systems  Review of Systems   Cardiovascular: Negative.  Negative for chest pain, palpitations, orthopnea, claudication, leg swelling and PND.   Gastrointestinal: Positive for nausea. Negative for abdominal pain and vomiting.   Musculoskeletal: Positive for joint pain and myalgias.   All other systems reviewed and are negative.      Past Medical History   has a past medical history of Diverticulitis and Pulmonary nodule.     Surgical History   has a past surgical history that includes colon resection.     Family History  family history includes " Diabetes in his mother; Heart Disease in his father and mother.     Social History   reports that he quit smoking about 13 years ago. He has a 35.00 pack-year smoking history. He has never used smokeless tobacco. He reports that he does not drink alcohol or use drugs.    Allergies  No Known Allergies    Medications  Prior to Admission Medications   Prescriptions Last Dose Informant Patient Reported? Taking?   atorvastatin (LIPITOR) 10 MG Tab 9/11/2020 at stopped  Yes Yes   Sig: Take 10 mg by mouth every evening.   hydroCHLOROthiazide (HYDRODIURIL) 25 MG Tab 9/23/2020 at 0900  Yes Yes   Sig: Take 25 mg by mouth every day.   sildenafil citrate (VIAGRA) 100 MG tablet 9/19/2020 at 2100  No No   Sig: Take 1 Tab by mouth as needed for Erectile Dysfunction.      Facility-Administered Medications: None       Physical Exam  Temp:  [36.1 °C (97 °F)] 36.1 °C (97 °F)  Pulse:  [60-88] 65  Resp:  [17-21] 20  BP: (145-185)/() 154/97  SpO2:  [90 %-97 %] 93 %    Physical Exam  Vitals signs reviewed.   Constitutional:       Appearance: Normal appearance. He is normal weight.   HENT:      Head: Normocephalic and atraumatic.      Nose: Nose normal.      Mouth/Throat:      Mouth: Mucous membranes are dry.      Pharynx: Oropharynx is clear.   Eyes:      Extraocular Movements: Extraocular movements intact.      Conjunctiva/sclera: Conjunctivae normal.      Pupils: Pupils are equal, round, and reactive to light.   Neck:      Musculoskeletal: Normal range of motion and neck supple.   Cardiovascular:      Rate and Rhythm: Normal rate and regular rhythm.      Pulses: Normal pulses.      Heart sounds: Normal heart sounds.   Pulmonary:      Effort: Pulmonary effort is normal.      Breath sounds: Normal breath sounds.   Abdominal:      General: Abdomen is flat. Bowel sounds are normal.      Palpations: Abdomen is soft.   Musculoskeletal: Normal range of motion.   Skin:     General: Skin is warm and dry.      Capillary Refill: Capillary  refill takes 2 to 3 seconds.   Neurological:      General: No focal deficit present.      Mental Status: He is alert and oriented to person, place, and time.   Psychiatric:         Mood and Affect: Mood normal.         Behavior: Behavior normal.         Laboratory:  Recent Labs     09/23/20 2023   WBC 9.5   RBC 5.24   HEMOGLOBIN 16.7   HEMATOCRIT 47.8   MCV 91.2   MCH 31.9   MCHC 34.9   RDW 43.6   PLATELETCT 220   MPV 10.4     Recent Labs     09/23/20 2023   SODIUM 127*   POTASSIUM 3.9   CHLORIDE 86*   CO2 25   GLUCOSE 133*   BUN 12   CREATININE 0.62   CALCIUM 10.1     Recent Labs     09/23/20 2023   ALTSGPT 31   ASTSGOT 87*   ALKPHOSPHAT 58   TBILIRUBIN 0.9   LIPASE 66   GLUCOSE 133*     Recent Labs     09/23/20 2220   APTT 32.1   INR 1.06     No results for input(s): NTPROBNP in the last 72 hours.      Recent Labs     09/23/20 2023   TROPONINT 728*       Imaging:  DX-CHEST-PORTABLE (1 VIEW)   Final Result      No acute cardiopulmonary abnormality.      EC-ECHOCARDIOGRAM COMPLETE W/O CONT    (Results Pending)         Assessment/Plan:  I anticipate this patient will require at least two midnights for appropriate medical management, necessitating inpatient admission.    Benign essential HTN- (present on admission)  Assessment & Plan  Well controlled with home medications  BP elevated at 185/104 on admission      ACS (acute coronary syndrome) (HCC)- (present on admission)  Assessment & Plan  Normal chest xray  Dr. Elena consult appreciated  ACS protocol in place, serial troponins and EKG  Asymptomatic  Nausea resolved with Zofran  ASA and heparin given in the ER    Transaminitis- (present on admission)  Assessment & Plan  Patient with AST of 85 present on admission  Discontinued Atorvastatin 4 days ago after 25 days of use  Will continue to monitor      T wave inversion in EKG- (present on admission)  Assessment & Plan  Serial EKG's ordered  EKG showing PAC's, T wave inversion at inferior leads with elevated  CKMB and elevated Troponin    Elevated troponin level- (present on admission)  Assessment & Plan  Troponin of 728 on admission  Serial Troponins ordered, Cardiology consulted  Patient is admitted for ACS     Dyslipidemia- (present on admission)  Assessment & Plan  -Patient was started on Atorvastatin one month ago by PCP, and self discontinued this 4 days ago due to muscle aches and pains for the past 2 weeeks  -Held Atorvastatin currently    Smoking history- (present on admission)  Assessment & Plan  Patient with 35 py hx of smoking, quit in 2007

## 2020-09-24 NOTE — THERAPY
Contact Note    Patient Name: Cem Esquivel  Age:  70 y.o., Sex:  male  Medical Record #: [id number]  Today's Date: 9/24/2020    PT cardiac rehab consult received; awaiting cardiac cath/angio today per chart; will hold until post procedure and final cardiac POC as appropriate/available

## 2020-09-24 NOTE — DISCHARGE PLANNING
Anticipated Discharge Disposition: TBD     Action: LSW received a call from ROME in the ER that pt was in cath lab and pt coded. It was requested for LSW to call pt's spouse, Deepti.   LSW called Deepti and informed her that there was a change in pt's medical status. Pt has coded and MD requesting for her to come to the hospital.   Deepti in shock not sure what to say. Deepti stated she was on her way.     LSW informed Deepti that ER Manisha PETER will meet her in the ER and will guide her in the right direction    Barriers to Discharge: None     Plan: LSW to assist as needed         Addendum 1542  SANTIW informed ER Manisha PETER that LSW able to speak to Deepti and informed Manisha that SANTIW requested for Deepti to go to ER and look for Manisha.   SANTI provided Deepti's number ([phone]) to Manisha.

## 2020-09-24 NOTE — PROGRESS NOTES
"Hospital Medicine Daily Progress Note    Date of Service  9/24/2020    Chief Complaint  70 y.o. male presented with uncontrolled BP, nausea and generalized bodyache admitted 9/23/2020 with ACS    Hospital Course    70 y.o. male with HTN, HLD, 35 pyhx quit smoking in 08/07, family hx of CAD and CHF,presented to the ER BIB EMS on 9/23 evening due to \"nausea, hot flashes, and irritability\" after checking his BP at home, and saw that it was 190/120.  [facility] and has been sore in his b/l upper extremities, but did not think much about this until afternoon on 9/23 when he suddenly began feeling much worse as described above. Patient was found to have elevated Troponin, and CKMB in the ER and Dr. Elena (cardiology) was consulted.  EKG was performed showing multiple premature atrial complexes, and T wave inversion at inferior leads. Chest xray showed no acute cardiopulmonary abnormalities.  Patient was admitted for management of ACS.  Patient denied chest pain or discomfort with exercise, headache, sweating, but described resolution of nausea following Zofran given in the ER.  Patient denied current muscle pain or pain at extremities at the time of admission. Pt was started on ASA and heparin gtt.          Interval Problem Update  Vitals was within normal parameters; BP was noted elevated on arrival.  Since then, BP has been in low 110 ranges. HR in 50-60s.     WBC 9.5 > 11.7 noted; Na at 127 > 126  Troponin 728 > 854  Lipid panel reviewed  Cardiology consult [facility] planned today    At bedside, patient denied chest pain.  However, complained about stomach discomfort and muscle ache since recently starting statin which has been discontinued last Friday.    Consultants/Specialty  Cardiology    Code Status  Full Code    Disposition  Likely Home    Discussed with patient, patient's nurse and with multidisciplinary team during rounds including " , pharmacist and charge nurse.        Review of Systems  Review of Systems   Constitutional: Negative for chills, fever and malaise/fatigue.   HENT: Negative for congestion, sore throat and tinnitus.    Eyes: Negative for blurred vision and double vision.   Respiratory: Negative for cough, sputum production and shortness of breath.    Cardiovascular: Negative for chest pain, palpitations, orthopnea, leg swelling and PND.   Gastrointestinal: Positive for nausea. Negative for abdominal pain, heartburn and vomiting.   Genitourinary: Negative for dysuria, frequency and urgency.   Musculoskeletal: Positive for myalgias. Negative for neck pain.   Neurological: Negative for dizziness, focal weakness and headaches.   Psychiatric/Behavioral: Negative for depression.        Physical Exam  Temp:  [36.1 °C (97 °F)-36.9 °C (98.4 °F)] 36.6 °C (97.9 °F)  Pulse:  [52-88] 52  Resp:  [16-21] 16  BP: (114-185)/() 114/74  SpO2:  [90 %-97 %] 90 %    Physical Exam  Vitals signs and nursing note reviewed.   Constitutional:       General: He is not in acute distress.     Appearance: Normal appearance. He is not ill-appearing.   HENT:      Head: Normocephalic and atraumatic.      Mouth/Throat:      Mouth: Mucous membranes are moist.      Pharynx: Oropharynx is clear.   Eyes:      General: No scleral icterus.     Conjunctiva/sclera: Conjunctivae normal.   Cardiovascular:      Rate and Rhythm: Normal rate and regular rhythm.      Pulses: Normal pulses.      Heart sounds: Normal heart sounds.   Pulmonary:      Effort: Pulmonary effort is normal. No respiratory distress.      Breath sounds: Normal breath sounds. No wheezing.   Abdominal:      General: Bowel sounds are normal. There is no distension.      Palpations: Abdomen is soft.      Tenderness: There is no abdominal tenderness.   Musculoskeletal: Normal range of motion.         General: No swelling or tenderness.   Skin:     General: Skin is warm and dry.      Capillary  Refill: Capillary refill takes less than 2 seconds.   Neurological:      General: No focal deficit present.      Mental Status: He is alert and oriented to person, place, and time. Mental status is at baseline.   Psychiatric:         Mood and Affect: Mood normal.         Fluids  No intake or output data in the 24 hours ending 09/24/20 1446    Laboratory  Recent Labs     09/23/20 2023 09/24/20  0328   WBC 9.5 11.7*   RBC 5.24 5.46   HEMOGLOBIN 16.7 17.3   HEMATOCRIT 47.8 50.8   MCV 91.2 93.0   MCH 31.9 31.7   MCHC 34.9 34.1   RDW 43.6 44.3   PLATELETCT 220 232   MPV 10.4 10.1     Recent Labs     09/23/20 2023 09/24/20  0328   SODIUM 127* 126*   POTASSIUM 3.9 3.9   CHLORIDE 86* 83*   CO2 25 31   GLUCOSE 133* 120*   BUN 12 13   CREATININE 0.62 0.66   CALCIUM 10.1 10.0     Recent Labs     09/23/20 2220 09/24/20  0738   APTT 32.1 33.1   INR 1.06 0.96         Recent Labs     09/24/20  0328   TRIGLYCERIDE 90   HDL 72   LDL 78       Imaging  DX-CHEST-PORTABLE (1 VIEW)   Final Result      No acute cardiopulmonary abnormality.      EC-ECHOCARDIOGRAM COMPLETE W/O CONT    (Results Pending)   CL-LEFT HEART CATHETERIZATION WITH POSSIBLE INTERVENTION    (Results Pending)      EKG reviewed - NSR; T inversions in inferior leads    Assessment/Plan  * ACS (acute coronary syndrome) (HCC)- (present on admission)  Assessment & Plan  Normal chest xray  Dr. Elena consult appreciated - [facility] planned for today  ACS protocol in place, serial troponins and EKG  Asymptomatic  Nausea resolved with Zofran  ASA and heparin gtt    Hyponatremia- (present on admission)  Assessment & Plan  Will monitor    Benign essential HTN- (present on admission)  Assessment & Plan  Well controlled with home medications  BP elevated at 185/104 on admission  BP here on unit has been controlled since      Transaminitis- (present on admission)  Assessment & Plan  Patient with AST of 85 present on admission  Discontinued Atorvastatin 4 days ago after 25 days of  use  Will continue to monitor      T wave inversion in EKG- (present on admission)  Assessment & Plan  EKG showing PAC's, T wave inversion at inferior leads with elevated CKMB and elevated Troponin  [facility] planned today    Elevated troponin level- (present on admission)  Assessment & Plan  Troponin of 728 on admission  Serial Troponins ordered, Cardiology consulted  Patient is admitted for ACS     Dyslipidemia- (present on admission)  Assessment & Plan  -Patient was started on Atorvastatin one month ago by PCP, and self discontinued this 4 days ago due to muscle aches and pains for the past 2 weeeks  -Held Atorvastatin currently    Smoking history- (present on admission)  Assessment & Plan  Patient with 35 py hx of smoking, quit in 2007       VTE prophylaxis: currently on heparin gtt

## 2020-09-24 NOTE — ASSESSMENT & PLAN NOTE
Well controlled with home medications  BP elevated at 185/104 on admission  BP here on unit has been controlled since

## 2020-09-24 NOTE — ED PROVIDER NOTES
ED Provider Note    CHIEF COMPLAINT  Chief Complaint   Patient presents with   • Nausea   • Generalized Body Aches   • Hypertension       HPI  Cem Esquivel is a 70 y.o. male who presents to the emergency department with complaint of generalized body aches including that of muscle ache and joint ache.  Longstanding history of hypertension on same HCTZ dose for approximately 1 year which is relatively controlled with systolic blood pressures in the 140s.  He notes he was recently started on a statin medication from his primary care physician for elevated cholesterol levels.  He however became significantly symptomatic with the above symptoms.  He has since been off this medication for the last 5 days but continues to feel generally unwell with persistent nausea and aches as above.  Denies any chest pain or palpitations.  No shortness of breath.  No abdominal pain.  No diarrhea.  No other recent illness.  He has not had a COVID test.  No known covert contacts.  He has recently been seen at [facility] with negative blood work and sent back to primary care physician.  Again he contacted his primary care surgeon with his persistent symptoms and was sent back to the emerge department for reevaluation.  He was able to call 911 and came to the hospital today.  He was given Zofran prior to arrival by EMS which made him feel significantly better.    REVIEW OF SYSTEMS  See HPI for further details. All other systems are negative.     PAST MEDICAL HISTORY   has a past medical history of Diverticulitis and Pulmonary nodule.    SOCIAL HISTORY  Social History     Tobacco Use   • Smoking status: Former Smoker     Packs/day: 1.00     Years: 35.00     Pack years: 35.00     Quit date: 2007     Years since quittin.1   • Smokeless tobacco: Never Used   Substance and Sexual Activity   • Alcohol use: No     Alcohol/week: 0.0 oz   • Drug use: No   • Sexual activity: Yes     Partners: Male       SURGICAL HISTORY   has a past  "surgical history that includes colon resection.    CURRENT MEDICATIONS  Home Medications     Reviewed by Carmelina Callejas on 09/23/20 at 2209  Med List Status: Complete   Medication Last Dose Status   atorvastatin (LIPITOR) 10 MG Tab 9/11/2020 Active   hydroCHLOROthiazide (HYDRODIURIL) 25 MG Tab 9/23/2020 Active   sildenafil citrate (VIAGRA) 100 MG tablet 9/19/2020 Active                ALLERGIES  No Known Allergies    PHYSICAL EXAM  VITAL SIGNS: /102   Pulse 60   Temp 36.9 °C (98.4 °F) (Temporal)   Resp 16   Ht 1.727 m (5' 8\")   Wt 67.9 kg (149 lb 11.1 oz)   SpO2 95%   BMI 22.76 kg/m²  @LUIS[[id number]::@   Pulse ox interpretation: I interpret this pulse ox as normal.  Constitutional: Alert in no apparent distress.  HENT: No signs of trauma, Bilateral external ears normal, Nose normal.   Eyes: Pupils are equal and reactive, Conjunctiva normal, Non-icteric.   Neck: Normal range of motion, No tenderness, Supple, No stridor.   Cardiovascular: Regular rate and rhythm, no murmurs.  Hypertensive on monitor  Thorax & Lungs: Normal breath sounds, No respiratory distress, No wheezing, No chest tenderness.   Abdomen: Bowel sounds normal, Soft, No tenderness, No masses, No pulsatile masses. No peritoneal signs.  Skin: Warm, Dry, No erythema, No rash.   Extremities: Intact distal pulses, No edema, No tenderness  Musculoskeletal: Good range of motion in all major joints. No tenderness to palpation or major deformities noted.   Neurologic: Alert , Normal motor function, Normal sensory function, No focal deficits noted.   Psychiatric: Affect normal, Judgment normal, Mood normal.       DIAGNOSTIC STUDIES / PROCEDURES    EKG  Results for orders placed or performed during the hospital encounter of 09/23/20   EKG (NOW)   Result Value Ref Range    Report       [facility] Emergency Dept.    Test Date:  2020-09-23  Pt Name:    NII JON               Department: ER  MRN:        [id number]                   "    Room:        23  Gender:     Male                         Technician: [id number]  :        1950                   Requested By:JUAREZ DUARTE  Order #:    [id number]                    Reading MD: Juarez Duarte    Measurements  Intervals                                Axis  Rate:       61                           P:          41  KY:         172                          QRS:        -6  QRSD:       96                           T:          -39  QT:         480  QTc:        484    Interpretive Statements  SINUS RHYTHM  ABNORMAL T, CONSIDER ISCHEMIA, INFERIOR LEADS  BORDERLINE PROLONGED QT INTERVAL  No previous ECG available for comparison  Electronically Signed On 2020 21:50:48 PDT by Juarez Duarte     EKG (NOW)   Result Value Ref Range    Report       [facility] Emergency Dept.    Test Date:  2020  Pt Name:    NII [place]               Department: ER  MRN:        [id number]                      Room:        23  Gender:     Male                         Technician: [id number]  :        1950                   Requested By:JUAREZ DUARTE  Order #:    [id number]                    Reading MD:    Measurements  Intervals                                Axis  Rate:       78                           P:          25  KY:         152                          QRS:        6  QRSD:       90                           T:          -58  QT:         436  QTc:        497    Interpretive Statements  SINUS RHYTHM  MULTIPLE ATRIAL PREMATURE COMPLEXES  NONSPECIFIC T ABNORMALITIES, INFERIOR LEADS  Compared to ECG 2020 20:34:49  Atrial premature complex(es) now present  Possible ischemia no longer present  T-wave abnormality still present     EKG in four (4) hours   Result Value Ref Range    Report       [facility]    Test Date:  2020  Pt Name:    NII JON               Department: 183  MRN:        [id number]                      Room:       [facility]  Gender:     Male                          Technician: CHRISTOPHER  :        1950                   Requested By:FEDERICA BATISTA  Order #:    [id number]                    Reading MD:    Measurements  Intervals                                Axis  Rate:       55                           P:          16  VA:         160                          QRS:        -14  QRSD:       98                           T:          -9  QT:         460  QTc:        440    Interpretive Statements  SINUS BRADYCARDIA  NONSPECIFIC T ABNORMALITIES, INFERIOR LEADS  Compared to ECG 2020 21:57:59  Sinus rhythm no longer present  Atrial premature complex(es) no longer present  T-wave abnormality still present           LABS  Results for orders placed or performed during the hospital encounter of 20   CBC WITH DIFFERENTIAL   Result Value Ref Range    WBC 9.5 4.8 - 10.8 K/uL    RBC 5.24 4.70 - 6.10 M/uL    Hemoglobin 16.7 14.0 - 18.0 g/dL    Hematocrit 47.8 42.0 - 52.0 %    MCV 91.2 81.4 - 97.8 fL    MCH 31.9 27.0 - 33.0 pg    MCHC 34.9 33.7 - 35.3 g/dL    RDW 43.6 35.9 - 50.0 fL    Platelet Count 220 164 - 446 K/uL    MPV 10.4 9.0 - 12.9 fL    Neutrophils-Polys 75.00 (H) 44.00 - 72.00 %    Lymphocytes 16.50 (L) 22.00 - 41.00 %    Monocytes 7.60 0.00 - 13.40 %    Eosinophils 0.30 0.00 - 6.90 %    Basophils 0.20 0.00 - 1.80 %    Immature Granulocytes 0.40 0.00 - 0.90 %    Nucleated RBC 0.00 /100 WBC    Neutrophils (Absolute) 7.10 1.82 - 7.42 K/uL    Lymphs (Absolute) 1.56 1.00 - 4.80 K/uL    Monos (Absolute) 0.72 0.00 - 0.85 K/uL    Eos (Absolute) 0.03 0.00 - 0.51 K/uL    Baso (Absolute) 0.02 0.00 - 0.12 K/uL    Immature Granulocytes (abs) 0.04 0.00 - 0.11 K/uL    NRBC (Absolute) 0.00 K/uL   COMP METABOLIC PANEL   Result Value Ref Range    Sodium 127 (L) 135 - 145 mmol/L    Potassium 3.9 3.6 - 5.5 mmol/L    Chloride 86 (L) 96 - 112 mmol/L    Co2 25 20 - 33 mmol/L    Anion Gap 16.0 7.0 - 16.0    Glucose 133 (H) 65 - 99 mg/dL    Bun 12 8 - 22 mg/dL    Creatinine 0.62 0.50 -  1.40 mg/dL    Calcium 10.1 8.5 - 10.5 mg/dL    AST(SGOT) 87 (H) 12 - 45 U/L    ALT(SGPT) 31 2 - 50 U/L    Alkaline Phosphatase 58 30 - 99 U/L    Total Bilirubin 0.9 0.1 - 1.5 mg/dL    Albumin 4.4 3.2 - 4.9 g/dL    Total Protein 7.5 6.0 - 8.2 g/dL    Globulin 3.1 1.9 - 3.5 g/dL    A-G Ratio 1.4 g/dL   LIPASE   Result Value Ref Range    Lipase 66 11 - 82 U/L   TROPONIN   Result Value Ref Range    Troponin T 728 (H) 6 - 19 ng/L   CKMB   Result Value Ref Range    CK-Mb 32.7 (H) 0.0 - 5.0 ng/mL   COVID/SARS CoV-2 PCR    Specimen: Nasopharyngeal; Respirate   Result Value Ref Range    COVID Order Status Received    SARS-CoV-2, PCR (In-House)   Result Value Ref Range    SARS-CoV-2 Source NP Swab    ESTIMATED GFR   Result Value Ref Range    GFR If African American >60 >60 mL/min/1.73 m 2    GFR If Non African American >60 >60 mL/min/1.73 m 2   aPTT   Result Value Ref Range    APTT 32.1 24.7 - 36.0 sec   Prothrombin Time   Result Value Ref Range    PT 14.1 12.0 - 14.6 sec    INR 1.06 0.87 - 1.13   Heparin Xa (Unfractionated)   Result Value Ref Range    Heparin Xa (UFH) <0.10 IU/mL   Troponin - STAT Once   Result Value Ref Range    Troponin T 723 (H) 6 - 19 ng/L   proBrain Natriuretic Peptide, NT   Result Value Ref Range    NT-proBNP 1937 (H) 0 - 125 pg/mL   EKG (NOW)   Result Value Ref Range    Report       [facility] Emergency Dept.    Test Date:  2020  Pt Name:    NII JON               Department: ER  MRN:        [id number]                      Room:       [facility]  Gender:     Male                         Technician: [id number]  :        1950                   Requested By:JUAREZ DUARTE  Order #:    [id number]                    Reading MD: Juarez Duarte    Measurements  Intervals                                Axis  Rate:       61                           P:          41  CA:         172                          QRS:        -6  QRSD:       96                           T:          -39  QT:          480  QTc:        484    Interpretive Statements  SINUS RHYTHM  ABNORMAL T, CONSIDER ISCHEMIA, INFERIOR LEADS  BORDERLINE PROLONGED QT INTERVAL  No previous ECG available for comparison  Electronically Signed On 2020 21:50:48 PDT by Juarez Duarte     EKG (NOW)   Result Value Ref Range    Report       [facility] Emergency Dept.    Test Date:  2020  Pt Name:    NII JON               Department: ER  MRN:        [id number]                      Room:       GR 23  Gender:     Male                         Technician: [id number]  :        1950                   Requested By:JUAREZ DUARTE  Order #:    [id number]                    Reading MD:    Measurements  Intervals                                Axis  Rate:       78                           P:          25  LA:         152                          QRS:        6  QRSD:       90                           T:          -58  QT:         436  QTc:        497    Interpretive Statements  SINUS RHYTHM  MULTIPLE ATRIAL PREMATURE COMPLEXES  NONSPECIFIC T ABNORMALITIES, INFERIOR LEADS  Compared to ECG 2020 20:34:49  Atrial premature complex(es) now present  Possible ischemia no longer present  T-wave abnormality still present     EKG in four (4) hours   Result Value Ref Range    Report       [facility]    Test Date:  2020  Pt Name:    NII [place]               Department: 183  MRN:        [id number]                      Room:       T823  Gender:     Male                         Technician: [id number]  :        1950                   Requested By:FEDERICA BATISTA  Order #:    [id number]                    Reading MD:    Measurements  Intervals                                Axis  Rate:       55                           P:          16  LA:         160                          QRS:        -14  QRSD:       98                           T:          -9  QT:         460  QTc:        440    Interpretive Statements  SINUS  BRADYCARDIA  NONSPECIFIC T ABNORMALITIES, INFERIOR LEADS  Compared to ECG 09/23/2020 21:57:59  Sinus rhythm no longer present  Atrial premature complex(es) no longer present  T-wave abnormality still present           RADIOLOGY  DX-CHEST-PORTABLE (1 VIEW)   Final Result      No acute cardiopulmonary abnormality.      EC-ECHOCARDIOGRAM COMPLETE W/O CONT    (Results Pending)        CRITICAL CARE  The very real possibilty of a deterioration of this patient's condition required the highest level of my preparedness for sudden, emergent intervention.  I provided critical care services, which included medication orders, frequent reevaluations of the patient's condition and response to treatment, ordering and reviewing test results, and discussing the case with various consultants.  The critical care time associated with the care of the patient was 35 minutes. Review chart for interventions. This time is exclusive of any other billable procedures.       COURSE & MEDICAL DECISION MAKING  Pertinent Labs & Imaging studies reviewed. (See chart for details)  Patient presented the emerge department the above complaint.  Initially had strong suspicion for statin related pathology.  Laboratory evaluation however came back for significantly elevated troponin.  Discussed the case with the cardiologist which is agreeable to ongoing inpatient care.  Aspirin and heparin have been started here.  COVID testing is pending.  Elevated BNP.  Hypertensive which is been treated here in the ER.  Hospitalist agreeable to ongoing inpatient care.  Moderate heart score.    FINAL IMPRESSION  1. T wave inversion in EKG    2. Elevated troponin    3. Hypertension, unspecified type            Electronically signed by: Juarez Bolton M.D., 9/23/2020 9:18 PM

## 2020-09-25 LAB
CA-I BLD ISE-SCNC: 0.85 MMOL/L (ref 1.1–1.3)
POTASSIUM BLD-SCNC: 4.6 MMOL/L (ref 3.6–5.5)
SODIUM BLD-SCNC: 132 MMOL/L (ref 135–145)

## 2020-09-25 NOTE — PROGRESS NOTES
Spiritual Care Note    Patient Information     Patient's Name: Cem Esquivel   MRN: [id number]    YOB: 1950   Age and Gender: 70 y.o. male   Service Area: [facility]   Room (and Bed): [place] [address]   Ethnicity or Nationality:     Primary Language: English   Church/Spiritual preference: None   Place of Residence: [place]   Family/Friends/Others Present: Yes   Clinical Team Present: Yes   Medical Diagnosis(-es)/Procedure(s): ACS   Code Status: Comfort Care/DNR    Date of Admission: 9/23/2020   Length of Stay: 1 days        Spiritual Care Provider Information:  Name of Spiritual Care Provider: Aster Land  Title of Spiritual Care Provider: Associate   Phone Number: [phone]  E-mail: [email or web address]  Total time : 15 minutes    Spiritual Screen Results:    Gen Nursing  Spiritual Screen  Is your spiritual health or inner well-being important to you as you cope with your medical condition?: No  Would you like to receive a visit from our Spiritual Care team or your own Quaker or spiritual leader?: No  Was spiritual care education provided to the patient?: Declined     Palliative Care  PC Church/Spiritual Screening  Was spiritual care education provided to the patient?: Declined      Encounter/Request Information  Encounter/Request Type   Visited With: Patient and family together  Nature of the Visit: Initial, Call back  Crisis Visit: Patient actively dying/EOL  Referral From/ Origin of Request: Phone, Verbal staff    Religous Needs/Values  Church Needs Visit  Church Needs: Prayer    Spiritual Assessment     Spiritual Care Encounters    Observations/Symptoms: Accepting, Sadness, Tearful, Thankfulness    Interaction/Conversation: MANNY Rogers called this  to come to the hospital to be with family, who had decided to transition the pt to comfort care.  Pt unresponsive on vent; wife of 53 years, and family friend, sad but accepting.  Wife requested ecumenical prayer  (their spirituality is primarily 12 Step) and thanked the .    Assessment: Need, Distress    Need: Seeking Spiritual Assistance and Support    Distress: Grief/Loss/Bereavement    Interventions: Compassionate presence, active listening, prayer.    Outcomes: Spiritual Comfort    Plan: Visit Upon Request    Notes:

## 2020-09-25 NOTE — PROCEDURES
Central Line Insertion    Date/Time: 9/24/2020 5:39 PM  Performed by: Misha Gleason Jr., D.O.  Authorized by: Misha Gleason Jr., D.O.     Consent:     Consent obtained:  Emergent situation  Pre-procedure details:     Hand hygiene: Hand hygiene performed prior to insertion      Sterile barrier technique: All elements of maximal sterile technique followed      Skin preparation:  2% chlorhexidine    Skin preparation agent: Skin preparation agent completely dried prior to procedure    Sedation:     Sedation type:  Anxiolysis  Procedure details:     Location:  R internal jugular    Patient position:  Flat    Procedural supplies:  Triple lumen    Catheter size:  7 Fr    Landmarks identified: yes      Ultrasound guidance: yes      Sterile ultrasound techniques: Sterile gel and sterile probe covers were used      Number of attempts:  1    Successful placement: yes    Post-procedure details:     Post-procedure:  Dressing applied and line sutured    Assessment:  Blood return through all ports, free fluid flow, placement verified by x-ray and no pneumothorax on x-ray    Patient tolerance of procedure:  Tolerated well, no immediate complications

## 2020-09-25 NOTE — ASSESSMENT & PLAN NOTE
2/2 ACS  Too unstable for TTM  Post cardiac arrest care   -Ventilation: Goal PCO2 40-45, PaO2 ~100, low tidal volume ventilation   -Hemodynamic: Goaoal MAP >65 mmHg; fluid boluses and pressors (epinephrine, norepinephrine) as guided by bedside ultrasound and hemodynamics. Continue Impella   -Cardiac: Treat ACS if indicated, treat arrhythmia as required, Echo   -Neurologic: Serial neurologic exams, currently localizing to pain and opening eyes to voice   -Metabolic: Serial lactate, goal blood glucose 120-180, maintain euvolemia, monitor urine output, maintain potassium greater than 3.5, maintain magnesium greater than 2  Cardiology on board  Poor prognosis

## 2020-09-25 NOTE — PROCEDURES
Pulmonary/Critical Care Medicine   Progress Note    Date of service: 9/24/2020  Time: 15:33    Responded to CODE BLUE, 70 yom admitted for NSTEMI, Cath with severe triple vessel disease. PEA arrest in cath lab. CPR started immediately. ACLS followed, the patient was transferred back to the cath lab table, intubated and re-cathed. Partially through the cardiac cath he regained a pulse however he had several episodes of VF which he was defibrillated for. His proximal left main and LAD were stented, he had an impella and temporary pacemaker placed. Unfortunately at the end of the case he had little cardiac activity and was supported primarily with  impella and vasopressors. He is to be transferred to the ICU.    Misha Gleason Jr., D.O.  [facility] Critical Care  [id number]

## 2020-09-25 NOTE — PROGRESS NOTES
Patient brought up to room by code team and cath lab team on impella at P9. Dr Gleason bedside throughout. Patient in pulseless VTACH at 1727.  Patient defibrillated at 1727 with 200j. Patient converted to Sinus rhythm. Patient started on Amiodarone gtt at 1mg/min at 1728. Patient given 100mcg fentanyl at 1734. 2mg Versed given at 1740.

## 2020-09-25 NOTE — PROGRESS NOTES
1730 Assumed care from cath lab.  Report received from Hector in cath lab.  Patient brought up to unit via cath lab team and code team.  Dr. Gleason at bedside.  Impella through left groin with small hematoma noted and MD aware.  Sand bag placed to groin as well as outlined.  2 venous sheaths to the right great.  Patient went into vtach and shocked x1.  Blood pressures in the 45/30 per impella.  MD did not want to start CPR per cardiology request.   1800 Dr. Gleason placed central line.  Wife at bedside.  Updates were provided to wife from Dr. Gleason.    1815 Ok Do rep at bedside and advocated for cpr to be started d/t blood pressure.  Escalated concern to dr. Gleason who came and discussed situation with Ok BRYANT as well as the wife.  Code status changed to DNR and not to escalate care.   1835 Report given to Sonia BRYANT.

## 2020-09-25 NOTE — PROCEDURES
Intubation    Date/Time: 9/24/2020 3:44 PM  Performed by: Misha Gleason Jr., D.O.  Authorized by: Misha Gleason Jr., D.O.     Consent:     Consent obtained:  Emergent situation  Pre-procedure details:     Patient status:  Unresponsive    Mallampati score:  II    Pretreatment medications:  None    Paralytics:  None  Procedure details:     Preoxygenation:  Bag valve mask    CPR in progress: yes      Intubation method:  Oral    Oral intubation technique:  Direct    Laryngoscope type:  Direct laryngoscopy    Laryngoscope blade:  Mac 4    Cormack-Lehane Classification:  Grade 1    Tube size (mm):  8.0    Tube type:  Cuffed    Number of attempts:  1    Cricoid pressure: no      Tube visualized through cords: yes    Placement assessment:     ETT to lip:  25    Tube secured with:  ETT balbuena    Breath sounds:  Equal    Placement verification: chest rise, condensation, CXR verification, direct visualization, equal breath sounds and ETCO2 detector      CXR findings:  ETT in proper place  Post-procedure details:     Patient tolerance of procedure:  Tolerated well, no immediate complications

## 2020-09-25 NOTE — ASSESSMENT & PLAN NOTE
Severe triple vessel disease  Not a surgical candidate with cardiogenic shock  S/P PCI to LAD and left main  Heparin and DAPT

## 2020-09-25 NOTE — ASSESSMENT & PLAN NOTE
2/2 Cardiac arrest and CAD/NSTEMI  Currently completely impella and vasopressor dependent for perfusion  Poor prognosis, unlikely to survive hospitalization.  Titrating vasopressors

## 2020-09-25 NOTE — DISCHARGE SUMMARY
Death Summary    Cause of Death  Cardiogenic shock due to cardiac arrest due to cardiac ischemia due to severe triple vessel coronary artery disease.    Comorbid Conditions at the Time of Death  Principal Problem:    ACS (acute coronary syndrome) (HCC) POA: Yes  Active Problems:    Smoking history POA: Yes    Dyslipidemia POA: Yes    Elevated troponin level POA: Yes    T wave inversion in EKG POA: Yes    Transaminitis POA: Yes    Benign essential HTN POA: Yes    Hyponatremia (Chronic) POA: Yes    Cardiac arrest (HCC) POA: Yes    Ventricular tachycardia (HCC) POA: Unknown    CAD (coronary artery disease) POA: Yes    Cardiogenic shock (HCC) POA: Yes  Resolved Problems:    * No resolved hospital problems. *      History of Presenting Illness and Hospital Course  This is a 70 y.o. male with a pmhx of HTN, HLP, CHF, tobacco abuse who presented 9/23/2020 with NSTEMI. He was admitted to the hospitalist service and today had a cardiac catheterization which showed severe triple vessel disease. As he was leaving the cath lab he began to complain of left arm pain then became pulseless. He was re-cathed while CPR was ongoing. He had ROSC and his LAD/Left main were both stented. He had an impella and pacer placed however was in ventricular standstill relying on impella and vasopressors for perfusion. \    The patient's wife came to bedside and was informed of the bleak nature of her husbands's condition. His hemodynamics became progressively worse despite aggressive treatment of metabolic derangements and supramaximal dosing of vasopressors and titration of Impella. His wfe made him DNR and progressed to comfort care as he became neurologically unresponsive with likely cerebral ischemia/brain death. He was compassionately extubated following a arti visit and allowed to die with peace and dignity.    Death Date: 09/24/20   Death Time: 2034       Pronounced By (RN1): Sonia Mcbride  Pronounced By (RN2): Rose Stratton

## 2020-09-25 NOTE — DOCUMENTATION QUERY
[facility]                                                                       Query Response Note      PATIENT:               NII JON  ACCT #:                  [id number]  MRN:                     [id number]  :                      1950  ADMIT DATE:       2020 8:13 PM  DISCH DATE:        2020 8:34 PM  RESPONDING  PROVIDER #:        [id number]           QUERY TEXT:    Congestive Heart Failure is documented in the Medical Record. Please document the type and acuity (includes probable or suspected).     NOTE:  If an appropriate response is not listed below, please respond with a new note.    The patient's Clinical Indicators include:  U/S Cardiac ECHO noted severely reduced left ventricular systolic function, akinesis of all segments other than the basal inferior, and an LVEF of less than 10%. Troponin T: 728, CK-Mb: 32.7, and NT-proBNP: 1937 on admission.    Treatments include: U/S Cardiac ECHO, Lisinopril, and Metoprolol.    Risk factors include: dx NSTEMI s/p Stenting w/Impella Placement, dx Cardiogenic Shock, hx CAD, hx HTN + CHF, and Advanced Age.  Options provided:   -- Acute Systolic heart failure   -- Chronic Systolic heart failure   -- Acute on Chronic Systolic heart failure   -- Acute Diastolic heart failure   -- Chronic Diastolic heart failure   -- Acute on Chronic Diastolic heart failure   -- Acute Systolic and Diastolic heart failure   -- Chronic Systolic and Diastolic heart failure   -- Acute on Chronic Systolic and diastolic heart failure   -- Unable to determine      Query created by: Brendon Hodge on 2020 9:19 AM    RESPONSE TEXT:    Acute Systolic heart failure          Electronically signed by:  RASHID GRAY MD 2020 9:58 AM

## 2020-09-25 NOTE — PROGRESS NOTES
Patient extubated for comfort care after  visit. Wedding ring removed and given to wife, per wife request

## 2020-09-25 NOTE — PROCEDURES
Cardiac Catheterization and Percutaneous Intervention Procedure Report    9/24/2020    Indication for procedure: Cardiac arrest, PEA    Procedures:  · Insertion of Impella catheter  · PCI of left main, proximal LAD  · Temporary pacer wire placement    Final diagnosis:   Successful PCI of left main, proximal LAD    Patient had diagnostic coronary angiogram, showed severe left main, severe ostial RCA disease.  On the way to his room patient became unresponsive.  Monitor showed pulseless electrical activity for few seconds then had bradycardia and ST elevations.  Immediate CPR was started.  Good quality of CPR maintained throughout.  Patient was intubated by intensivist.  With CPR he was getting intermittent return of spontaneous circulation, was waking up.  So I put him on Cath Lab table, right and left femoral arterial access was obtained.  Was unable to advance the wire past right iliac artery.  So used left common femoral arterial access and dilated and 14 [place] sheath was placed.  While performing CPR and Impella catheter was advanced to LV, about 2 L of flow was achieved.  Then I engage left main with EBU 3 5 and placed a 3.5 x 12 mm Dandy stent in left main extending into left anterior descending artery.  He had severe stenosis of left circumflex artery from jailing.  He had return of pulse and blood pressure.  Soon after he continued to decompensate.  Impella flow continues to fall and patient became pulseless.  Throughout the procedure patient was given intravenous heparin, ACT was maintained more than 200.  Repeat angiogram was performed which showed occluded left circumflex artery, severe disease noted distal to the stent.  Then it was dilated using 3.0 x 12 mm and another 3.0 x 12 mm Dandy stent was placed, able to restore good flow into LAD.  Despite multiple attempts I was not able to get back into left circumflex artery because of flush occlusion.  Patient continued to have poor flow through Impella.   Intermittent pulselessness.  Patient was also bradycardic.  So right femoral vein access was obtained, balloontipped temporary pacer wire was advanced to RV apex by Dr. Sexton, who was helping me with the case.  At the end of the case I was able to achieve 2.5 L/min Impella flow with intermittent ventricular activity but no significant Artline waveform, art line waveform was pretty much flatline, representing Impella flow.  We will continue to support him for next 2 to 3 hours correcting his acidosis and electrolytes.  Potassium was very low during the code.  If there is no significant improvement in next 2 to 3 hours patient will unlikely survive.        Electronically signed by   Paolo Bishop M.D., PRABHU  Interventional cardiologist  9/24/2020  5:15 PM

## 2020-09-25 NOTE — PROCEDURES
Procedure Performed: temporary pacemaker insertion    Procedure physician: Carlie  Assistant: None    Pre-procedure diagnosis/Indication: Bradycardia, cardiogenic shock  Post-procedure diagnosis: Same    Procedure description: The RFV was cannulated emergenly utilizing micropuncture technique. A 6F sheath was secured in placed. A 5F balloon tipped TVP was passed into the RV  and pacemaker capture was confirmed. The equipment was secured in place.    Specimens: None  EBL: minimal  Complications: None    Impression  1. Successful insertion of temporary, transvenous pacemaker

## 2020-09-25 NOTE — PROGRESS NOTES
RCC    Called by nursing staff to bedside.  Blood pressure 20/15 x1 hour.  Cardiac output 0.5 on Impella.  Paced rhythm at 70.  Oxygen saturations either not detectable or in the 60s-70s.  Patient unresponsive with widely dilated unresponsive pupils.  He has no corneal, cough, gag or other reflex.  It does not withdrawal to tactile stimuli.  He has no spontaneous movements and is writing the ventilator.  Case reviewed earlier with Dr. Gleason and I was informed the plan was to probably go to comfort care at 8 PM after another conversation with cardiology.  I spoke with the wife and her neighbor who is comforting her at the bedside at length and they agreed that was the plan.  Reviewed the case with nurse, respiratory therapy and Impella rep at the bedside as well.  Echocardiogram performed earlier and Impella apparently was in good position after arrival to [facility].  Query inadequate left ventricular filling from RV failure?  Patient appears brain-dead at this time.  Patient's wife would like to transition to comfort measures now.  A  is been called to pray with her request.  Bereavement tray has been requested as well.  Cardiology has informed as well and I believe they are on the way to the bedside to speak with the patient's wife.  Comfort care order set completed.

## 2020-09-25 NOTE — CONSULTS
Critical Care Consultation    Date of consult: 9/24/2020    Referring Physician  Misha Gleason Jr., D.O.    Reason for Consultation  Cardiac Arrest    History of Presenting Illness  70 y.o. male with a pmhx of HTN, HLP, CHF, tobacco abuse who presented 9/23/2020 with NSTEMI. He was admitted to the hospitalist service and today had a cardiac catheterization which showed severe triple vessel disease. As he was leaving the cath lab he began to complain of left arm pain then became pulseless. He was re-cathed while CPR was ongoing. He had ROSC and his LAD/Left main were both stented. He had an impella and pacer placed however was in ventricular standstill relying on impella and vasopressors for perfusion. His wife has been notified and is on her way.    Code Status  Full    Review of Systems  Review of Systems   Unable to perform ROS: Acuity of condition       Past Medical History   has a past medical history of Diverticulitis, Hyponatremia, and Pulmonary nodule.    Surgical History   has a past surgical history that includes colon resection.    Family History  family history includes Diabetes in his mother; Heart Disease in his father and mother.    Social History   reports that he quit smoking about 13 years ago. He has a 35.00 pack-year smoking history. He has never used smokeless tobacco. He reports that he does not drink alcohol or use drugs.    Medications  Home Medications     Reviewed by Carmelina Callejas on 09/23/20 at 2209  Med List Status: Complete   Medication Last Dose Status   atorvastatin (LIPITOR) 10 MG Tab 9/11/2020 Active   hydroCHLOROthiazide (HYDRODIURIL) 25 MG Tab 9/23/2020 Active   sildenafil citrate (VIAGRA) 100 MG tablet 9/19/2020 Active              Current Facility-Administered Medications   Medication Dose Route Frequency Provider Last Rate Last Dose   • EPINEPHRINE 1 MG/10ML INJ SOSY            • EPINEPHRINE 1 MG/10ML INJ SOSY            • POTASSIUM CHLORIDE 10 MEQ/100ML IV SOLN             • DOPamine 1600 mcg/mL in D5W premix  0-20 mcg/kg/min Intravenous Continuous Warren Sexton M.D. 50.9 mL/hr at 09/24/20 1624 20 mcg/kg/min at 09/24/20 1624   • AMIODARONE HCL IN DEXTROSE 150-4.21 MG/100ML-% IV SOLN            • SODIUM CHLORIDE 0.9 % IV SOLN            • [START ON 9/25/2020] aspirin (ASA) chewable tab 81 mg  81 mg Enteral Tube DAILY Yoli Cardenas M.D.       • PLASMA-LYTE A IV SOLN            • sodium bicarbonate 8.4 % injection 100 mEq  100 mEq Intravenous Once Misha Gleason Jr., D.O.       • MD ALERT...adult comfort care   Other PRN Marbin Herrera M.D.       • atropine 1 % ophthalmic solution 2 Drop  2 Drop Sublingual Q4HRS PRN Marbin Herrera M.D.       • morphine (pf) 10 mg/mL injection 5 mg  5 mg Intravenous Q HOUR PRN Marbin Herrera M.D.       • morphine (pf) 10 mg/mL injection 10 mg  10 mg Intravenous Q HOUR PRN Marbin Herrera M.D.       • LORazepam (ATIVAN) 2 MG/ML oral conc 1 mg  1 mg Sublingual Q HOUR PRN Marbin Herrera M.D.        Or   • LORazepam (ATIVAN) injection 1 mg  1 mg Intravenous Q HOUR PRN Marbin Herrera M.D.           Allergies  No Known Allergies    Vital Signs last 24 hours  Temp:  [35.4 °C (95.8 °F)-36.9 °C (98.4 °F)] 35.4 °C (95.8 °F)  Pulse:  [] 240  Resp:  [0-30] 0  BP: ()/() 60/44  SpO2:  [11 %-98 %] 98 %    Physical Exam  Physical Exam  Vitals signs reviewed.   Constitutional:       General: He is in acute distress.      Appearance: He is toxic-appearing.      Interventions: He is intubated.   HENT:      Head: Normocephalic and atraumatic.      Right Ear: External ear normal.      Left Ear: External ear normal.      Nose: Nose normal.      Mouth/Throat:      Mouth: Mucous membranes are moist.      Pharynx: Oropharynx is clear.   Eyes:      Extraocular Movements: Extraocular movements intact.      Conjunctiva/sclera: Conjunctivae normal.      Comments: Right pupil 8 mm, left 3 mm   Neck:      Musculoskeletal: No muscular  tenderness.   Cardiovascular:      Rate and Rhythm: Normal rate.      Comments: Paced at 90, no palpable pulses  Pulmonary:      Effort: Respiratory distress present. He is intubated.      Breath sounds: Rales present.   Abdominal:      General: Abdomen is flat. Bowel sounds are normal.      Palpations: Abdomen is soft.   Musculoskeletal:      Right lower leg: No edema.      Left lower leg: No edema.      Comments: B/L femoral sites accessed   Skin:     General: Skin is cool and dry.      Capillary Refill: Capillary refill takes more than 3 seconds.   Neurological:      Comments: Opens eyes and tracks around room. Reaching for ETT. Moving all 4   Psychiatric:      Comments: Unable to assess         Fluids  No intake or output data in the 24 hours ending 09/24/20 2150    Laboratory  Recent Results (from the past 48 hour(s))   CBC WITH DIFFERENTIAL    Collection Time: 09/23/20  8:23 PM   Result Value Ref Range    WBC 9.5 4.8 - 10.8 K/uL    RBC 5.24 4.70 - 6.10 M/uL    Hemoglobin 16.7 14.0 - 18.0 g/dL    Hematocrit 47.8 42.0 - 52.0 %    MCV 91.2 81.4 - 97.8 fL    MCH 31.9 27.0 - 33.0 pg    MCHC 34.9 33.7 - 35.3 g/dL    RDW 43.6 35.9 - 50.0 fL    Platelet Count 220 164 - 446 K/uL    MPV 10.4 9.0 - 12.9 fL    Neutrophils-Polys 75.00 (H) 44.00 - 72.00 %    Lymphocytes 16.50 (L) 22.00 - 41.00 %    Monocytes 7.60 0.00 - 13.40 %    Eosinophils 0.30 0.00 - 6.90 %    Basophils 0.20 0.00 - 1.80 %    Immature Granulocytes 0.40 0.00 - 0.90 %    Nucleated RBC 0.00 /100 WBC    Neutrophils (Absolute) 7.10 1.82 - 7.42 K/uL    Lymphs (Absolute) 1.56 1.00 - 4.80 K/uL    Monos (Absolute) 0.72 0.00 - 0.85 K/uL    Eos (Absolute) 0.03 0.00 - 0.51 K/uL    Baso (Absolute) 0.02 0.00 - 0.12 K/uL    Immature Granulocytes (abs) 0.04 0.00 - 0.11 K/uL    NRBC (Absolute) 0.00 K/uL   COMP METABOLIC PANEL    Collection Time: 09/23/20  8:23 PM   Result Value Ref Range    Sodium 127 (L) 135 - 145 mmol/L    Potassium 3.9 3.6 - 5.5 mmol/L    Chloride 86  (L) 96 - 112 mmol/L    Co2 25 20 - 33 mmol/L    Anion Gap 16.0 7.0 - 16.0    Glucose 133 (H) 65 - 99 mg/dL    Bun 12 8 - 22 mg/dL    Creatinine 0.62 0.50 - 1.40 mg/dL    Calcium 10.1 8.5 - 10.5 mg/dL    AST(SGOT) 87 (H) 12 - 45 U/L    ALT(SGPT) 31 2 - 50 U/L    Alkaline Phosphatase 58 30 - 99 U/L    Total Bilirubin 0.9 0.1 - 1.5 mg/dL    Albumin 4.4 3.2 - 4.9 g/dL    Total Protein 7.5 6.0 - 8.2 g/dL    Globulin 3.1 1.9 - 3.5 g/dL    A-G Ratio 1.4 g/dL   LIPASE    Collection Time: 20  8:23 PM   Result Value Ref Range    Lipase 66 11 - 82 U/L   TROPONIN    Collection Time: 20  8:23 PM   Result Value Ref Range    Troponin T 728 (H) 6 - 19 ng/L   CKMB    Collection Time: 20  8:23 PM   Result Value Ref Range    CK-Mb 32.7 (H) 0.0 - 5.0 ng/mL   ESTIMATED GFR    Collection Time: 20  8:23 PM   Result Value Ref Range    GFR If African American >60 >60 mL/min/1.73 m 2    GFR If Non African American >60 >60 mL/min/1.73 m 2   EKG (NOW)    Collection Time: 20  8:34 PM   Result Value Ref Range    Report       [facility]    Test Date:  2020  Pt Name:    NII JON               Department: ER  MRN:        [id number]                      Room:       [facility]  Gender:     Male                         Technician: [id number]  :        1950                   Requested By:JUAREZ DUARTE  Order #:    [id number]                    Reading MD: Juarez Duarte    Measurements  Intervals                                Axis  Rate:       61                           P:          41  SD:         172                          QRS:        -6  QRSD:       96                           T:          -39  QT:         480  QTc:        484    Interpretive Statements  SINUS RHYTHM  ABNORMAL T, CONSIDER ISCHEMIA, INFERIOR LEADS  BORDERLINE PROLONGED QT INTERVAL  No previous ECG available for comparison  Electronically Signed On 2020 21:50:48 PDT by Juarez Duarte     COVID/SARS CoV-2  PCR    Collection Time: 20  9:22 PM    Specimen: Nasopharyngeal; Respirate   Result Value Ref Range    COVID Order Status Received    SARS-CoV-2, PCR (In-House)    Collection Time: 20  9:22 PM   Result Value Ref Range    SARS-CoV-2 Source NP Swab     SARS-CoV-2 by PCR NotDetected    EKG (NOW)    Collection Time: 20  9:57 PM   Result Value Ref Range    Report       [facility]    Test Date:  2020  Pt Name:    NII JON               Department: ER  MRN:        [id number]                      Room:       [facility] [place]  Gender:     Male                         Technician: [id number]  :        1950                   Requested By:SPARKLE DUARTE  Order #:    [id number]                    Reading MD:    Measurements  Intervals                                Axis  Rate:       78                           P:          25  SD:         152                          QRS:        6  QRSD:       90                           T:          -58  QT:         436  QTc:        497    Interpretive Statements  SINUS RHYTHM  MULTIPLE ATRIAL PREMATURE COMPLEXES  NONSPECIFIC T ABNORMALITIES, INFERIOR LEADS  Compared to ECG 2020 20:34:49  Atrial premature complex(es) now present  Possible ischemia no longer present  T-wave abnormality still present     aPTT    Collection Time: 20 10:20 PM   Result Value Ref Range    APTT 32.1 24.7 - 36.0 sec   Prothrombin Time    Collection Time: 20 10:20 PM   Result Value Ref Range    PT 14.1 12.0 - 14.6 sec    INR 1.06 0.87 - 1.13   Heparin Xa (Unfractionated)    Collection Time: 20 10:20 PM   Result Value Ref Range    Heparin Xa (UFH) <0.10 IU/mL   Troponin - STAT Once    Collection Time: 20 11:08 PM   Result Value Ref Range    Troponin T 723 (H) 6 - 19 ng/L   proBrain Natriuretic Peptide, NT    Collection Time: 20 11:08 PM   Result Value Ref Range    NT-proBNP 1937 (H) 0 - 125 pg/mL   EKG in four (4) hours     Collection Time: 20  2:24 AM   Result Value Ref Range    Report       [facility]    Test Date:  2020  Pt Name:    NII JON               Department: 183  MRN:        [id number]                      Room:       T823  Gender:     Male                         Technician: CHRISTOPHER  :        1950                   Requested By:FEDERICA BATISTA  Order #:    [id number]                    Reading MD: Meliza Elena MD    Measurements  Intervals                                Axis  Rate:       55                           P:          16  ND:         160                          QRS:        -14  QRSD:       98                           T:          -9  QT:         460  QTc:        440    Interpretive Statements  SINUS BRADYCARDIA  NONSPECIFIC T ABNORMALITIES, INFERIOR LEADS  Compared to ECG 2020 21:57:59  Atrial premature complex(es) no longer present  Electronically Signed On 2020 6:23:15 PDT by Meliza Elena MD     CBC with Differential    Collection Time: 20  3:28 AM   Result Value Ref Range    WBC 11.7 (H) 4.8 - 10.8 K/uL    RBC 5.46 4.70 - 6.10 M/uL    Hemoglobin 17.3 14.0 - 18.0 g/dL    Hematocrit 50.8 42.0 - 52.0 %    MCV 93.0 81.4 - 97.8 fL    MCH 31.7 27.0 - 33.0 pg    MCHC 34.1 33.7 - 35.3 g/dL    RDW 44.3 35.9 - 50.0 fL    Platelet Count 232 164 - 446 K/uL    MPV 10.1 9.0 - 12.9 fL    Neutrophils-Polys 76.10 (H) 44.00 - 72.00 %    Lymphocytes 14.70 (L) 22.00 - 41.00 %    Monocytes 8.10 0.00 - 13.40 %    Eosinophils 0.50 0.00 - 6.90 %    Basophils 0.20 0.00 - 1.80 %    Immature Granulocytes 0.40 0.00 - 0.90 %    Nucleated RBC 0.00 /100 WBC    Neutrophils (Absolute) 8.94 (H) 1.82 - 7.42 K/uL    Lymphs (Absolute) 1.72 1.00 - 4.80 K/uL    Monos (Absolute) 0.95 (H) 0.00 - 0.85 K/uL    Eos (Absolute) 0.06 0.00 - 0.51 K/uL    Baso (Absolute) 0.02 0.00 - 0.12 K/uL    Immature Granulocytes (abs) 0.05 0.00 - 0.11 K/uL    NRBC (Absolute) 0.00 K/uL   Comp Metabolic Panel (CMP)    Collection  Time: 09/24/20  3:28 AM   Result Value Ref Range    Sodium 126 (L) 135 - 145 mmol/L    Potassium 3.9 3.6 - 5.5 mmol/L    Chloride 83 (L) 96 - 112 mmol/L    Co2 31 20 - 33 mmol/L    Anion Gap 12.0 7.0 - 16.0    Glucose 120 (H) 65 - 99 mg/dL    Bun 13 8 - 22 mg/dL    Creatinine 0.66 0.50 - 1.40 mg/dL    Calcium 10.0 8.5 - 10.5 mg/dL    AST(SGOT) 67 (H) 12 - 45 U/L    ALT(SGPT) 28 2 - 50 U/L    Alkaline Phosphatase 57 30 - 99 U/L    Total Bilirubin 1.0 0.1 - 1.5 mg/dL    Albumin 4.4 3.2 - 4.9 g/dL    Total Protein 7.3 6.0 - 8.2 g/dL    Globulin 2.9 1.9 - 3.5 g/dL    A-G Ratio 1.5 g/dL   Lipid Profile (Tomorrow AM)    Collection Time: 09/24/20  3:28 AM   Result Value Ref Range    Cholesterol,Tot 168 100 - 199 mg/dL    Triglycerides 90 0 - 149 mg/dL    HDL 72 >=40 mg/dL    LDL 78 <100 mg/dL   Troponin in four (4) hours    Collection Time: 09/24/20  3:28 AM   Result Value Ref Range    Troponin T 854 (H) 6 - 19 ng/L   ESTIMATED GFR    Collection Time: 09/24/20  3:28 AM   Result Value Ref Range    GFR If African American >60 >60 mL/min/1.73 m 2    GFR If Non African American >60 >60 mL/min/1.73 m 2   MAGNESIUM    Collection Time: 09/24/20  3:28 AM   Result Value Ref Range    Magnesium 1.7 1.5 - 2.5 mg/dL   aPTT    Collection Time: 09/24/20  7:38 AM   Result Value Ref Range    APTT 33.1 24.7 - 36.0 sec   Prothrombin Time    Collection Time: 09/24/20  7:38 AM   Result Value Ref Range    PT 13.1 12.0 - 14.6 sec    INR 0.96 0.87 - 1.13   Heparin Xa (Unfractionated)    Collection Time: 09/24/20  7:38 AM   Result Value Ref Range    Heparin Xa (UFH) <0.10 IU/mL   POC ACT (resulted by nursing)    Collection Time: 09/24/20  4:01 PM   Result Value Ref Range    Istat Activated Clotting Time 258 (H) 74 - 137 sec   ISTAT ARTERIAL BLOOD GAS    Collection Time: 09/24/20  4:13 PM   Result Value Ref Range    Ph 7.408 7.400 - 7.500    Pco2 63.0 (HH) 26.0 - 37.0 mmHg    Po2 92 (H) 64 - 87 mmHg    Tco2 42 (HH) 20 - 33 mmol/L    S02 97 93 - 99  %    Hco3 39.8 (H) 17.0 - 25.0 mmol/L    BE 13 (H) -4 - 3 mmol/L    Body Temp see below degrees    Specimen Arterial     Action Range Triggered YES     Inst. Qualified Patient YES    ISTAT SODIUM    Collection Time: 09/24/20  4:13 PM   Result Value Ref Range    Istat Sodium 142 135 - 145 mmol/L   ISTAT POTASSIUM    Collection Time: 09/24/20  4:13 PM   Result Value Ref Range    Istat Potassium 2.8 (L) 3.6 - 5.5 mmol/L   ISTAT IONIZED CA    Collection Time: 09/24/20  4:13 PM   Result Value Ref Range    Istat Ionized Calcium 0.92 (L) 1.10 - 1.30 mmol/L   ISTAT HEMATOCRIT AND HEMOGLOBIN    Collection Time: 09/24/20  4:13 PM   Result Value Ref Range    Istat Hematocrit 36 (L) 42 - 52 %    Istat Hemoglobin 12.2 (L) 14.0 - 18.0 g/dL   POC ACT (resulted by nursing)    Collection Time: 09/24/20  4:34 PM   Result Value Ref Range    Istat Activated Clotting Time 224 (H) 74 - 137 sec   ISTAT ARTERIAL BLOOD GAS    Collection Time: 09/24/20  5:00 PM   Result Value Ref Range    Ph 7.215 (LL) 7.400 - 7.500    Pco2 41.9 (H) 26.0 - 37.0 mmHg    Po2 77 64 - 87 mmHg    Tco2 18 (L) 20 - 33 mmol/L    S02 92 (L) 93 - 99 %    Hco3 16.9 (L) 17.0 - 25.0 mmol/L    BE -10 (L) -4 - 3 mmol/L    Body Temp 98.0 F degrees    O2 Therapy 100 %    iPF Ratio 77     Ph Temp Maral 7.220 (LL) 7.400 - 7.500    Pco2 Temp Co 41.3 (H) 26.0 - 37.0 mmHg    Po2 Temp Cor 75 64 - 87 mmHg    Specimen Arterial     Action Range Triggered YES     Inst. Qualified Patient YES    ISTAT SODIUM    Collection Time: 09/24/20  5:00 PM   Result Value Ref Range    Istat Sodium 135 135 - 145 mmol/L   ISTAT POTASSIUM    Collection Time: 09/24/20  5:00 PM   Result Value Ref Range    Istat Potassium 3.3 (L) 3.6 - 5.5 mmol/L   ISTAT IONIZED CA    Collection Time: 09/24/20  5:00 PM   Result Value Ref Range    Istat Ionized Calcium 0.86 (L) 1.10 - 1.30 mmol/L   ISTAT HEMATOCRIT AND HEMOGLOBIN    Collection Time: 09/24/20  5:00 PM   Result Value Ref Range    Istat Hematocrit 30 (L) 42 -  52 %    Istat Hemoglobin 10.2 (L) 14.0 - 18.0 g/dL   ISTAT ARTERIAL BLOOD GAS    Collection Time: 09/24/20  6:17 PM   Result Value Ref Range    Ph 7.159 (LL) 7.400 - 7.500    Pco2 45.9 (H) 26.0 - 37.0 mmHg    Po2 53 (L) 64 - 87 mmHg    Tco2 18 (L) 20 - 33 mmol/L    S02 77 (L) 93 - 99 %    Hco3 16.3 (L) 17.0 - 25.0 mmol/L    BE -12 (L) -4 - 3 mmol/L    Body Temp 86.6 F degrees    O2 Therapy 100 %    iPF Ratio 53     Ph Temp Maral 7.246 (LL) 7.400 - 7.500    Pco2 Temp Co 34.3 26.0 - 37.0 mmHg    Po2 Temp Cor 33 (LL) 64 - 87 mmHg    Specimen Arterial     Action Range Triggered YES     Inst. Qualified Patient YES    ISTAT HEMATOCRIT AND HEMOGLOBIN    Collection Time: 09/24/20  6:17 PM   Result Value Ref Range    Istat Hematocrit 31 (L) 42 - 52 %    Istat Hemoglobin 10.5 (L) 14.0 - 18.0 g/dL       Imaging  EC-ECHOCARDIOGRAM LTD W/O CONT   Final Result      DX-CHEST-PORTABLE (1 VIEW)   Final Result      1.  Lines and tubes appear appropriately located      2.  Mild right lung airspace process which could be asymmetric edema or infection.      DX-CHEST-PORTABLE (1 VIEW)   Final Result      No acute cardiopulmonary abnormality.      CL-LEFT HEART CATHETERIZATION WITH POSSIBLE INTERVENTION    (Results Pending)   EC-ECHOCARDIOGRAM COMPLETE W/O CONT    (Results Pending)       Assessment/Plan  * ACS (acute coronary syndrome) (HCC)- (present on admission)  Assessment & Plan  S/p PCI to LAD and left main  Not a CABG candidate    Cardiogenic shock ([facility])- (present on admission)  Assessment & Plan  2/2 Cardiac arrest and CAD/NSTEMI  Currently completely impella and vasopressor dependent for perfusion  Poor prognosis, unlikely to survive hospitalization.  Titrating vasopressors    CAD (coronary artery disease)- (present on admission)  Assessment & Plan  Severe triple vessel disease  Not a surgical candidate with cardiogenic shock  S/P PCI to LAD and left main  Heparin and DAPT    Ventricular tachycardia ([facility])  Assessment &  Plan  Continue Amiodarone gtt  Tele  defib prn    Cardiac arrest (HCC)- (present on admission)  Assessment & Plan  2/2 ACS  Too unstable for TTM  Post cardiac arrest care   -Ventilation: Goal PCO2 40-45, PaO2 ~100, low tidal volume ventilation   -Hemodynamic: Goaoal MAP >65 mmHg; fluid boluses and pressors (epinephrine, norepinephrine) as guided by bedside ultrasound and hemodynamics. Continue Impella   -Cardiac: Treat ACS if indicated, treat arrhythmia as required, Echo   -Neurologic: Serial neurologic exams, currently localizing to pain and opening eyes to voice   -Metabolic: Serial lactate, goal blood glucose 120-180, maintain euvolemia, monitor urine output, maintain potassium greater than 3.5, maintain magnesium greater than 2  Cardiology on board  Poor prognosis    Benign essential HTN- (present on admission)  Assessment & Plan  On pressors, monitor      Discussed patient condition and risk of morbidity and/or mortality with Family, RN, RT, Pharmacy,  and cardiology and [facility].      The patient remains critically ill.  Critical care time = 81 minutes in directly providing and coordinating critical care and extensive data review.  No time overlap and excludes procedures.

## 2020-09-25 NOTE — PROGRESS NOTES
1915 RN assumed care of patient. Upon assessment of patient found art line to read 21/14 with a MAP 18. Patient pupils fixed and dilated, no response to sternal rub, no spintaneous respirations noted on ventilator. No cough gag or corneal. RN stat paged md delgado and started levophed. RN paged MD lemus.  1920 MD Delgado at bedside discussing end of life with family. RN called for  and bereavement tray.  1925 family decides on comfort care, md delgado to put in orders. [facility] returned call, [facility]  [facility] after discussion with family RN to maintain gtts, impella and vent until  arrives. Both MDs aware of patient status. No escalation in care.

## 2020-09-29 ENCOUNTER — TELEPHONE (OUTPATIENT)
Dept: MEDICAL GROUP | Facility: PHYSICIAN GROUP | Age: 70
End: 2020-09-29

## 2020-09-29 NOTE — TELEPHONE ENCOUNTER
I received notification that pt had passed away.   I left a VM on wife's phone listed in chart to express my sorrow and condolences and offer any help to her.   Mert Muniz M.D.

## 2023-03-04 NOTE — CONSULTS
Cardiology Initial Consultation    Date of Service  9/24/2020    Referring Physician  Elio Horne M.D.    Reason for Consultation  NSTEMI    History of Presenting Illness  Cem Esquivel is a 70 y.o. male with no prior cardiac history who presented 9/23/2020 with uncontrolled hypertension.  Patient reports that about a month ago he was started on Lipitor for management of his hyperlipidemia following which he started feeling fatigue and diffuse muscle aches.  He spoke with his physician last Friday and his Lipitor was discontinued.      That same day he started having severe bilateral arm/neck aching pain that pretty much persisted till his presentation yesterday.  He attributes his symptoms to his statin.  He denies having any chest discomfort or dyspnea associated with them.  He denies any prior history of similar symptoms.  He checked his blood pressure yesterday and it was in the 180s/120s so he decided to call 911.  En route he received some medication which resolved his symptoms.  Since then he has been symptom-free.    Review of Systems  Review of Systems   Constitutional: Positive for malaise/fatigue.   Respiratory: Negative for shortness of breath.    Cardiovascular: Negative for chest pain, palpitations, orthopnea, leg swelling and PND.   Gastrointestinal: Negative for abdominal pain.   Musculoskeletal: Positive for myalgias. Negative for falls.   Neurological: Negative for dizziness and loss of consciousness.   Psychiatric/Behavioral: Negative for depression.   All other systems reviewed and are negative.      Past Medical History  Hypertension  Hyperlipidemia    Surgical History   has a past surgical history that includes colon resection.    Family History  family history includes Diabetes in his mother; Heart Disease in his father and mother.  Father was in his 60s    Social History   reports that he quit smoking about 13 years ago. He has a 35.00 pack-year smoking history. He has never used  smokeless tobacco. He reports that he does not drink alcohol or use drugs.    Home Medications   Prior to Admission Medications   Prescriptions Last Dose Informant Patient Reported? Taking?   atorvastatin (LIPITOR) 10 MG Tab 9/11/2020 at stopped  Yes Yes   Sig: Take 10 mg by mouth every evening.   hydroCHLOROthiazide (HYDRODIURIL) 25 MG Tab 9/23/2020 at 0900  Yes Yes   Sig: Take 25 mg by mouth every day.   sildenafil citrate (VIAGRA) 100 MG tablet 9/19/2020 at 2100  No No   Sig: Take 1 Tab by mouth as needed for Erectile Dysfunction.      Facility-Administered Medications: None       Inpatient Medications  • hydroCHLOROthiazide  25 mg DAILY   • senna-docusate  2 Tab BID    And   • polyethylene glycol/lytes  1 Packet QDAY PRN    And   • magnesium hydroxide  30 mL QDAY PRN    And   • bisacodyl  10 mg QDAY PRN   • acetaminophen  650 mg Q6HRS PRN   • enalaprilat  1.25 mg Q6HRS PRN   • aspirin EC  81 mg DAILY   • metoprolol  25 mg TWICE DAILY   • lisinopril  5 mg Q DAY   • morphine injection  2-4 mg Q5 MIN PRN   • ondansetron  4 mg Q4HRS PRN   • ondansetron  4 mg Q4HRS PRN       Allergies  No Known Allergies    Vital signs in last 24 hours  Temp:  [36.1 °C (97 °F)-36.9 °C (98.4 °F)] 36.6 °C (97.9 °F)  Pulse:  [55-88] 55  Resp:  [16-21] 16  BP: (145-185)/() 159/96  SpO2:  [90 %-97 %] 94 %    Physical Exam  Physical Exam   Constitutional: He is oriented to person, place, and time and well-developed, well-nourished, and in no distress. No distress.   HENT:   Head: Normocephalic and atraumatic.   Eyes: Conjunctivae are normal. No scleral icterus.   Neck: Normal range of motion. Neck supple. No JVD present.   Cardiovascular: Normal rate, regular rhythm and intact distal pulses. Exam reveals no gallop and no friction rub.   No murmur heard.  Pulmonary/Chest: Effort normal and breath sounds normal. No respiratory distress. He has no wheezes. He has no rales. He exhibits no tenderness.   Abdominal: Soft. Bowel sounds are  normal. He exhibits no distension. There is no abdominal tenderness.   Musculoskeletal: Normal range of motion.         General: No edema.   Neurological: He is alert and oriented to person, place, and time.   Skin: Skin is warm and dry. No rash noted. He is not diaphoretic.   Psychiatric: Mood, affect and judgment normal.   Nursing note and vitals reviewed.      Lab Review  Recent Labs     09/23/20 2023 09/24/20  0328   WBC 9.5 11.7*   RBC 5.24 5.46   HEMOGLOBIN 16.7 17.3   HEMATOCRIT 47.8 50.8   PLATELETCT 220 232   MCV 91.2 93.0   MPV 10.4 10.1     Recent Labs     09/23/20 2023 09/24/20  0328   SODIUM 127* 126*   POTASSIUM 3.9 3.9   CHLORIDE 86* 83*   CO2 25 31   GLUCOSE 133* 120*   BUN 12 13   CREATININE 0.62 0.66      Lab Results   Component Value Date/Time    TROPONINT 854 (H) 09/24/2020 03:28 AM    CKMB 32.7 (H) 09/23/2020 08:23 PM       Lab Results   Component Value Date/Time    ASTSGOT 67 (H) 09/24/2020 03:28 AM    ALTSGPT 28 09/24/2020 03:28 AM     Lab Results   Component Value Date/Time    CHOLSTRLTOT 168 09/24/2020 03:28 AM    LDL 78 09/24/2020 03:28 AM    HDL 72 09/24/2020 03:28 AM    TRIGLYCERIDE 90 09/24/2020 03:28 AM         Labs reviewed as noted above.  Normal hemoglobin and creatinine.  Troponin 854.    Cardiac Imaging and Procedures Review  EKG performed today at 2:24 AM was personally reviewed and per my interpretation shows sinus rhythm with T wave abnormalities in the inferior leads.    Prior EKG performed yesterday at 2034 hrs. showed T wave inversions in V5 and V6 and slight ST elevation in lead III.  No reciprocal changes.    Assessment/Plan    NSTEMI:  Statin induced myalgias:  Hypertension  Hyperlipidemia:    Patient's arm discomfort could be related to his statin however could also be anginal in nature.  His symptoms worsened after he discontinued his statin.  His troponin is elevated as noted above.  He will be referred for coronary angiogram today for further evaluation.  He is  normal... currently n.p.o.  Continue aspirin along with heparin drip.  Statin held given his history of myalgias.  If he has coronary artery disease will need to reevaluate risk versus benefits.  Blood pressure significantly elevated on presentation but improving.  Continue lisinopril and metoprolol at current dose and titrate as needed.  An echocardiogram is ordered and pending.      Thank you for allowing me to participate in the care of this patient. Please do not hesitate to contact me with any questions.    Meliza Elena MD, [facility]  Cardiologist  [facility]